# Patient Record
Sex: FEMALE | Race: WHITE | NOT HISPANIC OR LATINO | URBAN - METROPOLITAN AREA
[De-identification: names, ages, dates, MRNs, and addresses within clinical notes are randomized per-mention and may not be internally consistent; named-entity substitution may affect disease eponyms.]

---

## 2017-10-13 ENCOUNTER — INPATIENT (INPATIENT)
Facility: HOSPITAL | Age: 82
LOS: 4 days | Discharge: HOME | End: 2017-10-18
Attending: HOSPITALIST

## 2017-10-13 DIAGNOSIS — M10.9 GOUT, UNSPECIFIED: ICD-10-CM
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
## 2017-10-13 DIAGNOSIS — S72.009A FRACTURE OF UNSPECIFIED PART OF NECK OF UNSPECIFIED FEMUR, INITIAL ENCOUNTER FOR CLOSED FRACTURE: ICD-10-CM

## 2017-10-13 DIAGNOSIS — I10 ESSENTIAL (PRIMARY) HYPERTENSION: ICD-10-CM

## 2017-10-16 PROBLEM — Z00.00 ENCOUNTER FOR PREVENTIVE HEALTH EXAMINATION: Status: ACTIVE | Noted: 2017-10-16

## 2017-10-20 DIAGNOSIS — L03.116 CELLULITIS OF LEFT LOWER LIMB: ICD-10-CM

## 2017-10-20 DIAGNOSIS — F03.90 UNSPECIFIED DEMENTIA, UNSPECIFIED SEVERITY, WITHOUT BEHAVIORAL DISTURBANCE, PSYCHOTIC DISTURBANCE, MOOD DISTURBANCE, AND ANXIETY: ICD-10-CM

## 2017-10-20 DIAGNOSIS — I69.351 HEMIPLEGIA AND HEMIPARESIS FOLLOWING CEREBRAL INFARCTION AFFECTING RIGHT DOMINANT SIDE: ICD-10-CM

## 2017-10-20 DIAGNOSIS — E78.5 HYPERLIPIDEMIA, UNSPECIFIED: ICD-10-CM

## 2017-10-20 DIAGNOSIS — I10 ESSENTIAL (PRIMARY) HYPERTENSION: ICD-10-CM

## 2017-10-20 DIAGNOSIS — M10.9 GOUT, UNSPECIFIED: ICD-10-CM

## 2017-10-20 DIAGNOSIS — R26.2 DIFFICULTY IN WALKING, NOT ELSEWHERE CLASSIFIED: ICD-10-CM

## 2018-01-02 ENCOUNTER — INPATIENT (INPATIENT)
Facility: HOSPITAL | Age: 83
LOS: 0 days | Discharge: HOME | End: 2018-01-03
Attending: EMERGENCY MEDICINE

## 2018-01-02 DIAGNOSIS — I10 ESSENTIAL (PRIMARY) HYPERTENSION: ICD-10-CM
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
## 2018-01-02 DIAGNOSIS — M10.9 GOUT, UNSPECIFIED: ICD-10-CM

## 2018-01-02 DIAGNOSIS — Z86.73 PERSONAL HISTORY OF TRANSIENT ISCHEMIC ATTACK (TIA), AND CEREBRAL INFARCTION WITHOUT RESIDUAL DEFICITS: ICD-10-CM
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
## 2018-01-02 DIAGNOSIS — R53.1 WEAKNESS: ICD-10-CM

## 2018-01-02 DIAGNOSIS — S72.009A FRACTURE OF UNSPECIFIED PART OF NECK OF UNSPECIFIED FEMUR, INITIAL ENCOUNTER FOR CLOSED FRACTURE: ICD-10-CM
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
## 2019-02-19 ENCOUNTER — INPATIENT (INPATIENT)
Facility: HOSPITAL | Age: 84
LOS: 2 days | Discharge: NURSING, REHAB OR LONG-TERM CARE | End: 2019-02-22
Attending: SURGERY | Admitting: SURGERY
Payer: MEDICARE

## 2019-02-19 VITALS
HEART RATE: 80 BPM | OXYGEN SATURATION: 98 % | SYSTOLIC BLOOD PRESSURE: 167 MMHG | DIASTOLIC BLOOD PRESSURE: 77 MMHG | TEMPERATURE: 97 F | RESPIRATION RATE: 20 BRPM

## 2019-02-19 LAB
ALBUMIN SERPL ELPH-MCNC: 4 G/DL — SIGNIFICANT CHANGE UP (ref 3.5–5.2)
ALP SERPL-CCNC: 113 U/L — SIGNIFICANT CHANGE UP (ref 30–115)
ALT FLD-CCNC: 9 U/L — SIGNIFICANT CHANGE UP (ref 0–41)
ANION GAP SERPL CALC-SCNC: 19 MMOL/L — HIGH (ref 7–14)
APTT BLD: 28.7 SEC — SIGNIFICANT CHANGE UP (ref 27–39.2)
AST SERPL-CCNC: 18 U/L — SIGNIFICANT CHANGE UP (ref 0–41)
BASE EXCESS BLDV CALC-SCNC: 0.5 MMOL/L — SIGNIFICANT CHANGE UP (ref -2–2)
BASOPHILS # BLD AUTO: 0.05 K/UL — SIGNIFICANT CHANGE UP (ref 0–0.2)
BASOPHILS NFR BLD AUTO: 0.4 % — SIGNIFICANT CHANGE UP (ref 0–1)
BILIRUB DIRECT SERPL-MCNC: 0.2 MG/DL — SIGNIFICANT CHANGE UP (ref 0–0.2)
BILIRUB INDIRECT FLD-MCNC: 0.4 MG/DL — SIGNIFICANT CHANGE UP (ref 0.2–1.2)
BILIRUB SERPL-MCNC: 0.6 MG/DL — SIGNIFICANT CHANGE UP (ref 0.2–1.2)
BLD GP AB SCN SERPL QL: SIGNIFICANT CHANGE UP
BUN SERPL-MCNC: 22 MG/DL — HIGH (ref 10–20)
CA-I SERPL-SCNC: 1.23 MMOL/L — SIGNIFICANT CHANGE UP (ref 1.12–1.3)
CALCIUM SERPL-MCNC: 9.1 MG/DL — SIGNIFICANT CHANGE UP (ref 8.5–10.1)
CHLORIDE SERPL-SCNC: 99 MMOL/L — SIGNIFICANT CHANGE UP (ref 98–110)
CO2 SERPL-SCNC: 22 MMOL/L — SIGNIFICANT CHANGE UP (ref 17–32)
CREAT SERPL-MCNC: 1.2 MG/DL — SIGNIFICANT CHANGE UP (ref 0.7–1.5)
EOSINOPHIL # BLD AUTO: 0.11 K/UL — SIGNIFICANT CHANGE UP (ref 0–0.7)
EOSINOPHIL NFR BLD AUTO: 1 % — SIGNIFICANT CHANGE UP (ref 0–8)
ETHANOL SERPL-MCNC: <10 MG/DL — HIGH
GAS PNL BLDV: 140 MMOL/L — SIGNIFICANT CHANGE UP (ref 136–145)
GAS PNL BLDV: SIGNIFICANT CHANGE UP
GLUCOSE SERPL-MCNC: 130 MG/DL — HIGH (ref 70–99)
HCO3 BLDV-SCNC: 26 MMOL/L — SIGNIFICANT CHANGE UP (ref 22–29)
HCT VFR BLD CALC: 39.5 % — SIGNIFICANT CHANGE UP (ref 37–47)
HCT VFR BLDA CALC: 45.8 % — HIGH (ref 34–44)
HGB BLD CALC-MCNC: 14.9 G/DL — SIGNIFICANT CHANGE UP (ref 14–18)
HGB BLD-MCNC: 13.1 G/DL — SIGNIFICANT CHANGE UP (ref 12–16)
IMM GRANULOCYTES NFR BLD AUTO: 0.3 % — SIGNIFICANT CHANGE UP (ref 0.1–0.3)
INR BLD: 1.17 RATIO — SIGNIFICANT CHANGE UP (ref 0.65–1.3)
LACTATE BLDV-MCNC: 1.3 MMOL/L — SIGNIFICANT CHANGE UP (ref 0.5–1.6)
LACTATE SERPL-SCNC: 1.3 MMOL/L — SIGNIFICANT CHANGE UP (ref 0.5–2.2)
LIDOCAIN IGE QN: 46 U/L — SIGNIFICANT CHANGE UP (ref 7–60)
LYMPHOCYTES # BLD AUTO: 17.8 % — LOW (ref 20.5–51.1)
LYMPHOCYTES # BLD AUTO: 2.04 K/UL — SIGNIFICANT CHANGE UP (ref 1.2–3.4)
MCHC RBC-ENTMCNC: 30.4 PG — SIGNIFICANT CHANGE UP (ref 27–31)
MCHC RBC-ENTMCNC: 33.2 G/DL — SIGNIFICANT CHANGE UP (ref 32–37)
MCV RBC AUTO: 91.6 FL — SIGNIFICANT CHANGE UP (ref 81–99)
MONOCYTES # BLD AUTO: 1.14 K/UL — HIGH (ref 0.1–0.6)
MONOCYTES NFR BLD AUTO: 9.9 % — HIGH (ref 1.7–9.3)
NEUTROPHILS # BLD AUTO: 8.1 K/UL — HIGH (ref 1.4–6.5)
NEUTROPHILS NFR BLD AUTO: 70.6 % — SIGNIFICANT CHANGE UP (ref 42.2–75.2)
NRBC # BLD: 0 /100 WBCS — SIGNIFICANT CHANGE UP (ref 0–0)
PCO2 BLDV: 45 MMHG — SIGNIFICANT CHANGE UP (ref 41–51)
PH BLDV: 7.37 — SIGNIFICANT CHANGE UP (ref 7.26–7.43)
PLATELET # BLD AUTO: 283 K/UL — SIGNIFICANT CHANGE UP (ref 130–400)
PO2 BLDV: 21 MMHG — SIGNIFICANT CHANGE UP (ref 20–40)
POTASSIUM BLDV-SCNC: 4.1 MMOL/L — SIGNIFICANT CHANGE UP (ref 3.3–5.6)
POTASSIUM SERPL-MCNC: 4.6 MMOL/L — SIGNIFICANT CHANGE UP (ref 3.5–5)
POTASSIUM SERPL-SCNC: 4.6 MMOL/L — SIGNIFICANT CHANGE UP (ref 3.5–5)
PROT SERPL-MCNC: 7.3 G/DL — SIGNIFICANT CHANGE UP (ref 6–8)
PROTHROM AB SERPL-ACNC: 13.4 SEC — HIGH (ref 9.95–12.87)
RBC # BLD: 4.31 M/UL — SIGNIFICANT CHANGE UP (ref 4.2–5.4)
RBC # FLD: 13.2 % — SIGNIFICANT CHANGE UP (ref 11.5–14.5)
SAO2 % BLDV: 27 % — SIGNIFICANT CHANGE UP
SODIUM SERPL-SCNC: 140 MMOL/L — SIGNIFICANT CHANGE UP (ref 135–146)
TROPONIN T SERPL-MCNC: <0.01 NG/ML — SIGNIFICANT CHANGE UP
TYPE + AB SCN PNL BLD: SIGNIFICANT CHANGE UP
WBC # BLD: 11.48 K/UL — HIGH (ref 4.8–10.8)
WBC # FLD AUTO: 11.48 K/UL — HIGH (ref 4.8–10.8)

## 2019-02-19 PROCEDURE — 99291 CRITICAL CARE FIRST HOUR: CPT

## 2019-02-19 RX ORDER — LOSARTAN POTASSIUM 100 MG/1
1 TABLET, FILM COATED ORAL
Qty: 0 | Refills: 0 | COMMUNITY

## 2019-02-19 RX ORDER — ACETAMINOPHEN 500 MG
1000 TABLET ORAL ONCE
Qty: 0 | Refills: 0 | Status: COMPLETED | OUTPATIENT
Start: 2019-02-19 | End: 2019-02-19

## 2019-02-19 RX ORDER — LIDOCAINE 4 G/100G
1 CREAM TOPICAL ONCE
Qty: 0 | Refills: 0 | Status: COMPLETED | OUTPATIENT
Start: 2019-02-19 | End: 2019-02-19

## 2019-02-19 RX ORDER — APIXABAN 2.5 MG/1
1 TABLET, FILM COATED ORAL
Qty: 0 | Refills: 0 | COMMUNITY

## 2019-02-19 RX ORDER — CEFTRIAXONE 500 MG/1
1 INJECTION, POWDER, FOR SOLUTION INTRAMUSCULAR; INTRAVENOUS ONCE
Qty: 0 | Refills: 0 | Status: COMPLETED | OUTPATIENT
Start: 2019-02-19 | End: 2019-02-19

## 2019-02-19 RX ORDER — IBUPROFEN 200 MG
600 TABLET ORAL EVERY 6 HOURS
Qty: 0 | Refills: 0 | Status: DISCONTINUED | OUTPATIENT
Start: 2019-02-19 | End: 2019-02-21

## 2019-02-19 RX ORDER — ACETAMINOPHEN 500 MG
650 TABLET ORAL EVERY 6 HOURS
Qty: 0 | Refills: 0 | Status: DISCONTINUED | OUTPATIENT
Start: 2019-02-19 | End: 2019-02-19

## 2019-02-19 RX ORDER — ASPIRIN/CALCIUM CARB/MAGNESIUM 324 MG
1 TABLET ORAL
Qty: 0 | Refills: 0 | COMMUNITY

## 2019-02-19 RX ORDER — METOCLOPRAMIDE HCL 10 MG
10 TABLET ORAL DAILY
Qty: 0 | Refills: 0 | Status: DISCONTINUED | OUTPATIENT
Start: 2019-02-19 | End: 2019-02-22

## 2019-02-19 RX ORDER — LOSARTAN POTASSIUM 100 MG/1
100 TABLET, FILM COATED ORAL DAILY
Qty: 0 | Refills: 0 | Status: DISCONTINUED | OUTPATIENT
Start: 2019-02-19 | End: 2019-02-22

## 2019-02-19 RX ORDER — CHLORHEXIDINE GLUCONATE 213 G/1000ML
1 SOLUTION TOPICAL
Qty: 0 | Refills: 0 | Status: DISCONTINUED | OUTPATIENT
Start: 2019-02-19 | End: 2019-02-22

## 2019-02-19 RX ORDER — OXYCODONE HYDROCHLORIDE 5 MG/1
5 TABLET ORAL EVERY 6 HOURS
Qty: 0 | Refills: 0 | Status: DISCONTINUED | OUTPATIENT
Start: 2019-02-19 | End: 2019-02-22

## 2019-02-19 RX ORDER — ASPIRIN/CALCIUM CARB/MAGNESIUM 324 MG
81 TABLET ORAL DAILY
Qty: 0 | Refills: 0 | Status: DISCONTINUED | OUTPATIENT
Start: 2019-02-19 | End: 2019-02-22

## 2019-02-19 RX ORDER — AZITHROMYCIN 500 MG/1
500 TABLET, FILM COATED ORAL ONCE
Qty: 0 | Refills: 0 | Status: COMPLETED | OUTPATIENT
Start: 2019-02-19 | End: 2019-02-19

## 2019-02-19 RX ORDER — METOCLOPRAMIDE HCL 10 MG
1 TABLET ORAL
Qty: 0 | Refills: 0 | COMMUNITY

## 2019-02-19 RX ORDER — APIXABAN 2.5 MG/1
2.5 TABLET, FILM COATED ORAL EVERY 12 HOURS
Qty: 0 | Refills: 0 | Status: DISCONTINUED | OUTPATIENT
Start: 2019-02-19 | End: 2019-02-20

## 2019-02-19 RX ORDER — ACETAMINOPHEN 500 MG
650 TABLET ORAL EVERY 6 HOURS
Qty: 0 | Refills: 0 | Status: DISCONTINUED | OUTPATIENT
Start: 2019-02-19 | End: 2019-02-22

## 2019-02-19 RX ORDER — IBUPROFEN 200 MG
400 TABLET ORAL EVERY 6 HOURS
Qty: 0 | Refills: 0 | Status: DISCONTINUED | OUTPATIENT
Start: 2019-02-19 | End: 2019-02-19

## 2019-02-19 RX ORDER — PANTOPRAZOLE SODIUM 20 MG/1
40 TABLET, DELAYED RELEASE ORAL
Qty: 0 | Refills: 0 | Status: DISCONTINUED | OUTPATIENT
Start: 2019-02-19 | End: 2019-02-22

## 2019-02-19 RX ADMIN — AZITHROMYCIN 255 MILLIGRAM(S): 500 TABLET, FILM COATED ORAL at 17:41

## 2019-02-19 RX ADMIN — Medication 81 MILLIGRAM(S): at 21:07

## 2019-02-19 RX ADMIN — PANTOPRAZOLE SODIUM 40 MILLIGRAM(S): 20 TABLET, DELAYED RELEASE ORAL at 21:07

## 2019-02-19 RX ADMIN — CEFTRIAXONE 100 GRAM(S): 500 INJECTION, POWDER, FOR SOLUTION INTRAMUSCULAR; INTRAVENOUS at 17:41

## 2019-02-19 RX ADMIN — APIXABAN 2.5 MILLIGRAM(S): 2.5 TABLET, FILM COATED ORAL at 21:07

## 2019-02-19 RX ADMIN — LOSARTAN POTASSIUM 100 MILLIGRAM(S): 100 TABLET, FILM COATED ORAL at 21:07

## 2019-02-19 RX ADMIN — Medication 1000 MILLIGRAM(S): at 17:41

## 2019-02-19 RX ADMIN — LIDOCAINE 1 PATCH: 4 CREAM TOPICAL at 17:36

## 2019-02-19 RX ADMIN — Medication 10 MILLIGRAM(S): at 21:08

## 2019-02-19 NOTE — H&P ADULT - ASSESSMENT
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     PLAN:   -Pain control  - IS spirometry   - ambulate as tolerated  - ICU care as only pulling 750 on IS.  - Cont Xarelto for previously dx DVT. ASSESSMENT:  89y Female w/ PMHx of Stroke on ASA and DVT on Xarelto s/p fall, -HT, ?LOC, +AC, +ASA came with 2 cm abrasion on Right cheek and found to have Age-indeterminate nondisplaced fractures of the left 4th - 6th anterolateral ribs and age-indeterminate compression deformity of the L3   vertebral body.     PLAN:   -Pain control  - IS spirometry   - ambulate as tolerated  - ICU care as only pulling 750 on IS.  - Cont eliquis for previously dx DVT.     Senior Note Agree with Above.

## 2019-02-19 NOTE — ED ADULT TRIAGE NOTE - CHIEF COMPLAINT QUOTE
Patient diagnosed with a right leg DVT x 10 days ago. Patient on Eliquis. Patient sent in by NP for possible detachment. No complaints by the patient. No leg swelling.

## 2019-02-19 NOTE — ED PROVIDER NOTE - NS ED ROS FT
Constitutional: No fevers/chills.    Head: No injury, headache.    Eyes:  No eye pain or discharge. No injury.    ENMT:  No pain, discharge or infections. No neck pain or stiffness. No loss ROM.    Cardiac:  No chest pain, (+) SOB (-) edema.    Respiratory:  No cough or respiratory distress.     GI:  No nausea, vomiting, diarrhea or abdominal pain. No change in PO intake.    :  No frequency, urgency or burning. No change in urine output.    MS:  No leg pain, leg swelling, myalgia, muscle weakness, joint pain or back pain. No loss ROM.    Neuro:  No dizziness or weakness.  No LOC.    Skin:  No skin rash.

## 2019-02-19 NOTE — ED PROVIDER NOTE - CLINICAL SUMMARY MEDICAL DECISION MAKING FREE TEXT BOX
Pt here for right sided weakness and confusion sp fall on Saturday. Now unable to ambulate. Found ot have ?pna and rib fractures. Admitted to the trauma service. Given abx.

## 2019-02-19 NOTE — ED PROVIDER NOTE - CARE PLAN
Principal Discharge DX:	Multiple rib fractures  Secondary Diagnosis:	Frequent falls  Secondary Diagnosis:	Pneumonia

## 2019-02-19 NOTE — CONSULT NOTE ADULT - ASSESSMENT
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

## 2019-02-19 NOTE — ED ADULT NURSE NOTE - CHPI ED NUR SYMPTOMS POS
PAIN/s/p fall on Saturday- c/o pain all over body, unknown LOC, pt not on blood thinners. Daughter reports confusion since fall. Pt is alert and oriented x 3 at this time.

## 2019-02-19 NOTE — ED PROVIDER NOTE - ATTENDING CONTRIBUTION TO CARE
Pt seen in critical care area. hx from daughter, HHA and patient. hx of cva with residual right weakness and pain, ambulates with walker at baseline, presenting with worsening right sided weakness and pain sp fall on Saturday. +head trauma, no loc. On Eliquis and ASA. Non ambulatory currently per family at bedside. Also appears more confused per HHA. Face is baseline appearance/symmetry. Pt endorses mild shortness of breath at rest, no chest pain.   trauma alert activated.   VITAL SIGNS: I have reviewed nursing notes and confirm.  CONSTITUTIONAL: non-toxic, well appearing, + airway intact, GCS 15  SKIN: no rash, no petechiae. no ecchymosis, abrasion to right cheek, no lacerations  EYES: EOMI, pupils symmetric.  ENT: tongue midline, oral mucosa atraumatic, nares patent,  NECK: Supple; no cervical-thoracic-lumbar spine tenderness  CARD: RRR, equal radial pulses bilaterally 2+  RESP: CTAB, no respiratory distress, no crepitus over chest wall, left sternal tenderness without appreciable deformity or ecchymosis.  ABD: Soft, non-tender, non-distended  PELVIS: stable  EXT: Normal ROM x4. RUE generalized tenderness throughout extremity. no snuff box tenderness. equal strength bilaterally. LLE calf tenderness.  NEURO: Alert, oriented. CN2-12 intact, equal strength bilaterally, Sensation to soft touch grossly intact and symmetric in extremities.   PSYCH: Cooperative, appropriate.   Fall on A/c - trauma scan, xray, labs, ekg, analgesia, reassess.

## 2019-02-19 NOTE — ED PROVIDER NOTE - OBJECTIVE STATEMENT
89 yo female presents with fall and head trauma on Eliquis and ASA. Patient states she has some mild SOB. Patient/family denies fevers, chest pain, abdominal pain, nausea, vomiting, diarrhea, constipation, dizziness, weakness, recent travel, leg pain/swelling, urinary sx, cough, congestion, changes in PO intake, changes in urine output.

## 2019-02-19 NOTE — ED ADULT NURSE NOTE - NSIMPLEMENTINTERV_GEN_ALL_ED
Implemented All Fall with Harm Risk Interventions:  [place] to call system. Call bell, personal items and telephone within reach. Instruct patient to call for assistance. Room bathroom lighting operational. Non-slip footwear when patient is off stretcher. Physically safe environment: no spills, clutter or unnecessary equipment. Stretcher in lowest position, wheels locked, appropriate side rails in place. Provide visual cue, wrist band, yellow gown, etc. Monitor gait and stability. Monitor for mental status changes and reorient to person, place, and time. Review medications for side effects contributing to fall risk. Reinforce activity limits and safety measures with patient and family. Provide visual clues: red socks.

## 2019-02-19 NOTE — ED PROVIDER NOTE - CRITICAL CARE PROVIDED
consult w/ pt's family directly relating to pts condition/documentation/additional history taking/consultation with other physicians/interpretation of diagnostic studies/direct patient care (not related to procedure)

## 2019-02-19 NOTE — CONSULT NOTE ADULT - SUBJECTIVE AND OBJECTIVE BOX
Trauma Surgery Consultation Note    TRAUMA ACTIVATION LEVEL:  Trauma Alert    MECHANISM OF INJURY:      [x] Blunt  	[] MVC	[x] Fall	[] Pedestrian Struck	[] Motorcycle   [] Assault   [] Bicycle collision  [] Sports injury     [] Penetrating  	[] Gun Shot Wound 		[] Stab Wound    GCS: 15/15 	E: 4/4	V: 5/5	M: 6/6    89 year old F with PMH old stroke of unknown age (probably 1 years ago) with residual weakness and pain on Right side and on ASA, also diagnoses with DVT 10 days ago on Xarelto presented to ED s/p fall on Saturday, -HT, -LOC, +AC and +ASA. She fell on R side and striked her R cheek on floor. Since the fall, both pain and weakness has been increased and pt is unable to perform her activity of daily life as she was performing at baseline like using walker. She is complaining of pain all over body but more on R side. Pt does not remember well how she fell. Daughter reports confusion since fall. Pt is alert and oriented x 3 at this time.      PAST MEDICAL & SURGICAL HISTORY:  Stroke  DVT    Home Meds:  ASA  Eliquis  Losartan    Allergies:   No Known Allergies    Intolerances  None reported    Soc:   Advanced Directives: Presumed Full Code     ROS:    REVIEW OF SYSTEMS    [x] A ten-point review of systems was otherwise negative except as noted.  [ ] Due to altered mental status/intubation, subjective information were not able to be obtained from the patient. History was obtained, to the extent possible, from review of the chart and collateral sources of information.      --------------------------------------------------------------------------------------  VITAL SIGNS, INS/OUTS (last 24 hours):  --------------------------------------------------------------------------------------  ICU Vital Signs Last 24 Hrs  T(C): 36.7 (19 Feb 2019 14:05), Max: 36.7 (19 Feb 2019 13:55)  T(F): 98 (19 Feb 2019 14:05), Max: 98.1 (19 Feb 2019 13:55)  HR: 85 (19 Feb 2019 14:05) (80 - 85)  BP: 185/90 (19 Feb 2019 14:05) (167/77 - 188/78)  RR: 18 (19 Feb 2019 14:05) (18 - 20)  SpO2: 99% (19 Feb 2019 14:05) (98% - 99%)    PHYSICAL EXAM    General: NAD, AAOx3, calm and cooperative  HEENT: NCAT, Neck supple, 2 cm abrasion is noted on R cheek  Cardiac: RRR S1, S2  Respiratory: CTAB, normal respiratory effort, breath sounds equal BL  Abdomen: Soft, non-distended, non-tender, +bowel sounds  Musculoskeletal: LLE swelling w/ gastrocnemius tenderness  Neuro: Grossly intact  Skin: Warm/dry, normal color, no jaundice    LABS  Pending.    IMAGING  Pending. Trauma Surgery Consultation Note    TRAUMA ACTIVATION LEVEL:  Trauma Alert    MECHANISM OF INJURY:      [x] Blunt  	[] MVC	[x] Fall	[] Pedestrian Struck	[] Motorcycle   [] Assault   [] Bicycle collision  [] Sports injury     [] Penetrating  	[] Gun Shot Wound 		[] Stab Wound    GCS: 15/15 	E: 4/4	V: 5/5	M: 6/6    89 year old F with PMH old stroke of unknown age (probably 1 years ago) with residual weakness and pain on Right side and on ASA, also diagnoses with DVT 10 days ago on Xarelto presented to ED s/p fall on Saturday, -HT, -LOC, +AC and +ASA. She fell on R side and striked her R cheek on floor. Since the fall, both pain and weakness has been increased and pt is unable to perform her activity of daily life as she was performing at baseline like using walker. She is complaining of pain all over body but more on R side. Pt does not remember well how she fell. Daughter reports confusion since fall. Pt is alert and oriented x 3 at this time.      PAST MEDICAL & SURGICAL HISTORY:  Stroke  DVT    Home Meds:  ASA  Eliquis  Losartan    Allergies:   No Known Allergies    Intolerances  None reported    Soc:   Advanced Directives: Presumed Full Code     ROS:    REVIEW OF SYSTEMS    [x] A ten-point review of systems was otherwise negative except as noted.  [ ] Due to altered mental status/intubation, subjective information were not able to be obtained from the patient. History was obtained, to the extent possible, from review of the chart and collateral sources of information.      --------------------------------------------------------------------------------------  VITAL SIGNS, INS/OUTS (last 24 hours):  --------------------------------------------------------------------------------------  ICU Vital Signs Last 24 Hrs  T(C): 36.7 (19 Feb 2019 14:05), Max: 36.7 (19 Feb 2019 13:55)  T(F): 98 (19 Feb 2019 14:05), Max: 98.1 (19 Feb 2019 13:55)  HR: 85 (19 Feb 2019 14:05) (80 - 85)  BP: 185/90 (19 Feb 2019 14:05) (167/77 - 188/78)  RR: 18 (19 Feb 2019 14:05) (18 - 20)  SpO2: 99% (19 Feb 2019 14:05) (98% - 99%)    PHYSICAL EXAM    General: NAD, AAOx3, calm and cooperative  HEENT: NCAT, Neck supple, 2 cm abrasion is noted on R cheek  Cardiac: RRR S1, S2  Respiratory: CTAB, normal respiratory effort, breath sounds equal BL  Abdomen: Soft, non-distended, non-tender, +bowel sounds  Musculoskeletal: LLE swelling w/ gastrocnemius tenderness  Neuro: Grossly intact  Skin: Warm/dry, normal color, no jaundice    LABS  Pending.    IMAGING  Pending.    < from: Xray Chest 1 View AP/PA (02.19.19 @ 14:59) >  Impression:      Developing right-sidedairspace opacity.    < end of copied text >    < from: CT Angio Abdomen and Pelvis w/ IV Cont (02.19.19 @ 14:49) >    IMPRESSION:     1. No definite evidence of acute traumatic injury within the chest,   abdomen or pelvis.     2. New or increased focal patchy opacity within the right upper lobe,   possibly reflecting pneumonia in the appropriate clinical setting;   follow-up to resolution is suggested.     3. Age-indeterminate nondisplaced fractures of the left 4th - 6th   anterolateral ribs and age-indeterminate compression deformity of the L3   vertebral body.       < end of copied text >    < from: CT Angio Chest w/ IV Cont (02.19.19 @ 14:49) >  IMPRESSION:     1. No definite evidence of acute traumatic injury within the chest,   abdomen or pelvis.     2. New or increased focal patchy opacity within the right upper lobe,   possibly reflecting pneumonia in the appropriate clinical setting;   follow-up to resolution is suggested.     3. Age-indeterminate nondisplaced fractures of the left 4th - 6th   anterolateral ribs and age-indeterminate compression deformity of the L3   vertebral body.     < end of copied text >    < from: CT Head No Cont (02.19.19 @ 14:49) >  IMPRESSION:    In comparison with the prior noncontrast CT scan of the brain dated   January 2, 2018:    No acute intracranial hemorrhage.    Stable examination.    < end of copied text >    < from: CT Cervical Spine No Cont (02.19.19 @ 14:49) >  IMPRESSION:    In comparison with the prior noncontrast CT scan of the cervical spine   dated October 5, 2014:    No acute fracture demonstrated.    Stable examination.    < end of copied text >

## 2019-02-19 NOTE — CONSULT NOTE ADULT - ASSESSMENT
ASSESSMENT:  89y F s/p mechanical fall, with right sided weakness. MHx of Stroke on ASA and DVCT on Xarelto s/p fall, -HT, ?LOC, +AC, +ASA came with 2 cm abrasion on Right cheek    PLAN:   Neurologic: pain control with tylenol and motrin   Respiratory: decreased IS, pulling 750ml, with multiple rib fx   Cardiovascular: ASA and Losartan for pmhx of HTN  Gastrointestinal/Nutrition: diet DASH  Renal/Genitourinary: no shirley, strict I/Os   Hematologic:   Infectious Disease: possible infiltrate to RUL, c/w azithromycin and ceftriaxone   Lines/Tubes: PIV  Endocrine: no dx   Disposition: ICU for monitoring ASSESSMENT:  89y F s/p mechanical fall, with right sided weakness. MHx of Stroke on ASA and DVCT on Xarelto s/p fall, -HT, ?LOC, +AC, +ASA came with 2 cm abrasion on Right cheek    PLAN:   Neurologic: pain control with tylenol and motrin   Respiratory: decreased IS, pulling 750ml, with multiple rib fx   Cardiovascular: ASA and Losartan for pmhx of HTN  Gastrointestinal/Nutrition: diet DASH  Renal/Genitourinary: no shirley, strict I/Os   Hematologic: c/w eliquis for dvt   Infectious Disease: possible infiltrate to RUL, c/w azithromycin and ceftriaxone   Lines/Tubes: PIV  Endocrine: no dx   Disposition: ICU for monitoring

## 2019-02-19 NOTE — H&P ADULT - NSHPLABSRESULTS_GEN_ALL_CORE
Labs:  CAPILLARY BLOOD GLUCOSE  POCT Blood Glucose.: 105 mg/dL (19 Feb 2019 13:59)                          13.1   11.48 )-----------( 283      ( 19 Feb 2019 14:10 )             39.5     Auto Neutrophil %: 70.6 % (02-19-19 @ 14:10)  Auto Immature Granulocyte %: 0.3 % (02-19-19 @ 14:10)    02-19    140  |  99  |  22<H>  ----------------------------<  130<H>  4.6   |  22  |  1.2    Calcium, Total Serum: 9.1 mg/dL (02-19-19 @ 14:10)               7.3  | 0.6  | 18       ------------------[113     ( 19 Feb 2019 14:10 )  4.0  | 0.2  | 9           Lipase:46     Lactate, Blood: 1.3 mmol/L (02-19-19 @ 14:10)  Blood Gas Venous - Lactate: 1.3 mmoL/L (02-19-19 @ 13:46)  Coags:     13.40  ----< 1.17    ( 19 Feb 2019 14:10 )     28.7    CARDIAC MARKERS ( 19 Feb 2019 14:10 )  x     / <0.01 ng/mL / x     / x     / x          < from: CT Angio Abdomen and Pelvis w/ IV Cont (02.19.19 @ 14:49) >      IMPRESSION:     1. No definite evidence of acute traumatic injury within the chest,   abdomen or pelvis.     2. New or increased focal patchy opacity within the right upper lobe,   possibly reflecting pneumonia in the appropriate clinical setting;   follow-up to resolution is suggested.     3. Age-indeterminate nondisplaced fractures of the left 4th - 6th   anterolateral ribs and age-indeterminate compression deformity of the L3   vertebral body.       < from: CT Angio Chest w/ IV Cont (02.19.19 @ 14:49) >    IMPRESSION:     1. No definite evidence of acute traumatic injury within the chest,   abdomen or pelvis.     2. New or increased focal patchy opacity within the right upper lobe,   possibly reflecting pneumonia in the appropriate clinical setting;   follow-up to resolution is suggested.     3. Age-indeterminate nondisplaced fractures of the left 4th - 6th   anterolateral ribs and age-indeterminate compression deformity of the L3   vertebral body.   < from: CT Head No Cont (02.19.19 @ 14:49) >    IMPRESSION:    In comparison with the prior noncontrast CT scan of the brain dated   January 2, 2018:    No acute intracranial hemorrhage.    Stable examination.    < from: CT Cervical Spine No Cont (02.19.19 @ 14:49) >    IMPRESSION:In comparison with the prior noncontrast CT scan of the cervical spine   dated October 5, 2014:    No acute fracture demonstrated.    Stable examination.

## 2019-02-19 NOTE — ED PROVIDER NOTE - PHYSICAL EXAMINATION
GEN: Well appearing, in no apparent distress.    HEAD:  Normocephalic, abrasion to right cheek.    EYES:  Clear conjunctivae without injection, drainage or discharge.    ENMT:  Nasal mucosa moist; mouth moist without ulcerations or lesions; throat moist without erythema, exudate, ulcerations or lesions.    NECK:  Supple, no masses. Normal ROM.    CARDIAC:  RRR, normal S1 and S2, no murmurs, rubs or gallops.    RESP:  Respiratory rate and effort appear normal; lungs are clear to auscultation bilaterally; no rhonchi, rales or wheezes.    ABDOMEN:  Soft, non-tender, non-distended, no masses. Normal BS throughout.    MUSCULOSKELETAL: No leg swelling.  NEURO:  AAO x 3. Motor 5/5. Sensory intact. CN II – XII intact.     SKIN:  Normal skin color for age and race, well-perfused; warm and dry.

## 2019-02-19 NOTE — CONSULT NOTE ADULT - SUBJECTIVE AND OBJECTIVE BOX
SICU Consultation Note  =====================================================  HPI:     89 year old F with PMH old stroke of unknown age (probably 1 years ago) with residual weakness and pain on Right side and on ASA, also diagnoses with DVT 10 days ago on Xarelto presented to ED s/p fall on Saturday, -HT, -LOC, +AC and +ASA. She fell on R side and striked her R cheek on floor. Since the fall, both pain and weakness has been increased and pt is unable to perform her activity of daily life as she was performing at baseline like using walker. She is complaining of pain all over body but more on R side. Pt does not remember well how she fell. Daughter reports confusion since fall. Pt is alert and oriented x 3 at this time.        PAST MEDICAL & SURGICAL HISTORY:    Home Meds: Home Medications:  Aspir 81 oral delayed release tablet: 1 tab(s) orally once a day (19 Feb 2019 19:14)  Eliquis 2.5 mg oral tablet: 1 tab(s) orally 2 times a day (19 Feb 2019 19:14)  losartan 100 mg oral tablet: 1 tab(s) orally once a day (19 Feb 2019 19:14)  Reglan 10 mg oral tablet: 1 tab(s) orally once a day, As Needed (19 Feb 2019 19:14)    Allergies: Allergies    No Known Allergies    Intolerances      Soc:   Advanced Directives: Presumed Full Code     ROS:    REVIEW OF SYSTEMS    [x ] A ten-point review of systems was otherwise negative except as noted.  [ ] Due to altered mental status/intubation, subjective information were not able to be obtained from the patient. History was obtained, to the extent possible, from review of the chart and collateral sources of information.      CURRENT MEDICATIONS:   --------------------------------------------------------------------------------------  Neurologic Medications    Respiratory Medications    Cardiovascular Medications    Gastrointestinal Medications    Genitourinary Medications    Hematologic/Oncologic Medications    Antimicrobial/Immunologic Medications    Endocrine/Metabolic Medications    Topical/Other Medications  lidocaine   Patch 1 Patch Transdermal Once    --------------------------------------------------------------------------------------    VITAL SIGNS, INS/OUTS (last 24 hours):  --------------------------------------------------------------------------------------  ICU Vital Signs Last 24 Hrs  T(C): 36.7 (19 Feb 2019 14:05), Max: 36.7 (19 Feb 2019 13:55)  T(F): 98 (19 Feb 2019 14:05), Max: 98.1 (19 Feb 2019 13:55)  HR: 85 (19 Feb 2019 14:05) (80 - 85)  BP: 185/90 (19 Feb 2019 14:05) (167/77 - 188/78)  BP(mean): --  ABP: --  ABP(mean): --  RR: 18 (19 Feb 2019 14:05) (18 - 20)  SpO2: 99% (19 Feb 2019 14:05) (98% - 99%)    I&O's Summary    --------------------------------------------------------------------------------------    EXAM:  General/Neuro  RASS:   GCS:   Exam: Normal, NAD, alert, oriented x 3, no focal deficits. PERRLA  ***    Respiratory  Exam: Lungs clear to auscultation, Normal expansion/effort.  ***  [] Tracheostomy   [] Intubated  Mechanical Ventilation:     Cardiovascular  Exam: S1, S2.  Regular rate and rhythm.  Peripheral edema  ***  Cardiac Rhythm: Normal Sinus Rhythm  ECHO:     GI  Exam: Abdomen soft, Non-tender, Non-distended.  Gastrostomy / Jejunostomy tube in place.  Nasogastric tube in place.  Colostomy / Ileostomy.  ***  Wound:   ***  Current Diet:  NPO***      Tubes/Lines/Drains  ***  [x] Peripheral IV  [] Central Venous Line     	[] R	[] L	[] IJ	[] Fem	[] SC        Type:	    Date Placed:   [] Arterial Line		[] R	[] L	[] Fem	[] Rad	[] Ax	Date Placed:   [] PICC:         	[] Midline		[] Mediport           [] Urinary Catheter		Date Placed:     Extremities  Exam: Extremities warm, pink, well-perfused.        Derm:  Exam: Good skin turgor, no skin breakdown.      :   Exam: Thayer catheter in place.     LABS  --------------------------------------------------------------------------------------  Labs:  CAPILLARY BLOOD GLUCOSE      POCT Blood Glucose.: 105 mg/dL (19 Feb 2019 13:59)                          13.1   11.48 )-----------( 283      ( 19 Feb 2019 14:10 )             39.5       Auto Neutrophil %: 70.6 % (02-19-19 @ 14:10)  Auto Immature Granulocyte %: 0.3 % (02-19-19 @ 14:10)    02-19    140  |  99  |  22<H>  ----------------------------<  130<H>  4.6   |  22  |  1.2      Calcium, Total Serum: 9.1 mg/dL (02-19-19 @ 14:10)      LFTs:             7.3  | 0.6  | 18       ------------------[113     ( 19 Feb 2019 14:10 )  4.0  | 0.2  | 9           Lipase:46     Amylase:x         Lactate, Blood: 1.3 mmol/L (02-19-19 @ 14:10)  Blood Gas Venous - Lactate: 1.3 mmoL/L (02-19-19 @ 13:46)      Coags:     13.40  ----< 1.17    ( 19 Feb 2019 14:10 )     28.7        CARDIAC MARKERS ( 19 Feb 2019 14:10 )  x     / <0.01 ng/mL / x     / x     / x            Alcohol, Blood: <10 mg/dL (02-19-19 @ 14:10)          --------------------------------------------------------------------------------------    OTHER LABS    IMAGING RESULTS      ASSESSMENT:  89y Female ***    PLAN:   Neurologic:   Respiratory:   Cardiovascular:   Gastrointestinal/Nutrition:   Renal/Genitourinary:   Hematologic:   Infectious Disease:   Lines/Tubes:  Endocrine:   Disposition:     --------------------------------------------------------------------------------------    Critical Care Diagnoses: SICU Consultation Note  =====================================================  HPI:     89 year old F with PMH old stroke of unknown age (probably 1 years ago) with residual right sided weakness and pain on Right side and on ASA, also diagnoses with DVT 10 days ago on Xarelto presented to ED s/p fall on Saturday, -HT, -LOC, +AC and +ASA. She fell on R side and striked her R cheek on floor. Since the fall, both pain and weakness has been increased and pt is unable to perform her activity of daily life as she was performing at baseline like using walker. She is complaining of pain all over body but more on R side. Pt does not remember well how she fell. Daughter reports confusion since fall. Pt is alert and oriented x 3 at this time.    Monegasque speaking only     Home Meds: Home Medications:  Aspir 81 oral delayed release tablet: 1 tab(s) orally once a day (19 Feb 2019 19:14)  Eliquis 2.5 mg oral tablet: 1 tab(s) orally 2 times a day (19 Feb 2019 19:14)  losartan 100 mg oral tablet: 1 tab(s) orally once a day (19 Feb 2019 19:14)  Reglan 10 mg oral tablet: 1 tab(s) orally once a day, As Needed (19 Feb 2019 19:14)    Allergies: Allergies    No Known Allergies    Intolerances      Soc:   Advanced Directives: Presumed Full Code     ROS:    REVIEW OF SYSTEMS    [x ] A ten-point review of systems was otherwise negative except as noted.  [ ] Due to altered mental status/intubation, subjective information were not able to be obtained from the patient. History was obtained, to the extent possible, from review of the chart and collateral sources of information.      CURRENT MEDICATIONS:   --------------------------------------------------------------------------------------  Neurologic Medications  acetaminophen   Tablet .. 650 milliGRAM(s) Oral every 6 hours PRN Mild Pain (1 - 3)  ibuprofen  Tablet. 400 milliGRAM(s) Oral every 6 hours PRN Mild Pain (1 - 3)    Respiratory Medications    Cardiovascular Medications  Losartan 100mg  ASA 81 mg     Gastrointestinal Medications    Genitourinary Medications    Hematologic/Oncologic Medications    Antimicrobial/Immunologic Medications    Endocrine/Metabolic Medications    Topical/Other Medications  lidocaine   Patch 1 Patch Transdermal Once    --------------------------------------------------------------------------------------    VITAL SIGNS, INS/OUTS (last 24 hours):  --------------------------------------------------------------------------------------  ICU Vital Signs Last 24 Hrs  T(C): 36.7 (19 Feb 2019 14:05), Max: 36.7 (19 Feb 2019 13:55)  T(F): 98 (19 Feb 2019 14:05), Max: 98.1 (19 Feb 2019 13:55)  HR: 85 (19 Feb 2019 14:05) (80 - 85)  BP: 185/90 (19 Feb 2019 14:05) (167/77 - 188/78)  BP(mean): --  ABP: --  ABP(mean): --  RR: 18 (19 Feb 2019 14:05) (18 - 20)  SpO2: 99% (19 Feb 2019 14:05) (98% - 99%)    I&O's Summary    --------------------------------------------------------------------------------------    EXAM:  General/Neuro  GCS: 15  Exam: Normal, NAD, alert, oriented x 2, no focal deficits. PERRLA      Respiratory  Exam: Lungs clear to auscultation      Cardiovascular  Exam: S1, S2.  Regular rate and rhythm.   Cardiac Rhythm: Normal Sinus Rhythm    GI  Exam: Abdomen soft, Non-tender, Non-distended.    Current Diet:  dash      Tubes/Lines/Drains  ***  [x] Peripheral IV      Extremities  Exam: Extremities warm, pink, well-perfused.        Derm:  Exam: Good skin turgor, no skin breakdown.      :   Exam: Shirley catheter in place.     LABS  --------------------------------------------------------------------------------------  Labs:  CAPILLARY BLOOD GLUCOSE      POCT Blood Glucose.: 105 mg/dL (19 Feb 2019 13:59)                          13.1   11.48 )-----------( 283      ( 19 Feb 2019 14:10 )             39.5       Auto Neutrophil %: 70.6 % (02-19-19 @ 14:10)  Auto Immature Granulocyte %: 0.3 % (02-19-19 @ 14:10)    02-19    140  |  99  |  22<H>  ----------------------------<  130<H>  4.6   |  22  |  1.2      Calcium, Total Serum: 9.1 mg/dL (02-19-19 @ 14:10)      LFTs:             7.3  | 0.6  | 18       ------------------[113     ( 19 Feb 2019 14:10 )  4.0  | 0.2  | 9           Lipase:46     Amylase:x         Lactate, Blood: 1.3 mmol/L (02-19-19 @ 14:10)  Blood Gas Venous - Lactate: 1.3 mmoL/L (02-19-19 @ 13:46)      Coags:     13.40  ----< 1.17    ( 19 Feb 2019 14:10 )     28.7        CARDIAC MARKERS ( 19 Feb 2019 14:10 )  x     / <0.01 ng/mL / x     / x     / x            Alcohol, Blood: <10 mg/dL (02-19-19 @ 14:10)          --------------------------------------------------------------------------------------    OTHER LABS    IMAGING RESULTS     < from: Xray Chest 1 View AP/PA (02.19.19 @ 14:59) >  Impression:      Developing right-sidedairspace opacity.    < end of copied text >    < from: CT Angio Abdomen and Pelvis w/ IV Cont (02.19.19 @ 14:49) >    IMPRESSION:     1. No definite evidence of acute traumatic injury within the chest,   abdomen or pelvis.     2. New or increased focal patchy opacity within the right upper lobe,   possibly reflecting pneumonia in the appropriate clinical setting;   follow-up to resolution is suggested.     3. Age-indeterminate nondisplaced fractures of the left 4th - 6th   anterolateral ribs and age-indeterminate compression deformity of the L3   vertebral body.       < end of copied text >    < from: CT Angio Chest w/ IV Cont (02.19.19 @ 14:49) >  IMPRESSION:     1. No definite evidence of acute traumatic injury within the chest,   abdomen or pelvis.     2. New or increased focal patchy opacity within the right upper lobe,   possibly reflecting pneumonia in the appropriate clinical setting;   follow-up to resolution is suggested.     3. Age-indeterminate nondisplaced fractures of the left 4th - 6th   anterolateral ribs and age-indeterminate compression deformity of the L3   vertebral body.     < end of copied text >    < from: CT Head No Cont (02.19.19 @ 14:49) >  IMPRESSION:    In comparison with the prior noncontrast CT scan of the brain dated   January 2, 2018:    No acute intracranial hemorrhage.    Stable examination.    < end of copied text >    < from: CT Cervical Spine No Cont (02.19.19 @ 14:49) >  IMPRESSION:    In comparison with the prior noncontrast CT scan of the cervical spine   dated October 5, 2014:    No acute fracture demonstrated.    Stable examination.    < end of copied text >      ASSESSMENT:  89y F s/p mechanical fall, with right sided weakness. MHx of Stroke on ASA and DVCT on Xarelto s/p fall, -HT, ?LOC, +AC, +ASA came with 2 cm abrasion on Right cheek    PLAN:   Neurologic: pain control with tylenol and motrin   Respiratory: decreased IS, pulling 750ml, with multiple rib fx   Cardiovascular: ASA and Losartan for pmhx of HTN  Gastrointestinal/Nutrition: diet DASH  Renal/Genitourinary: no shirley, strict I/Os   Hematologic:   Infectious Disease: possible infiltrate to RUL, c/w azithromycin and ceftriaxone   Lines/Tubes: PIV  Endocrine: no dx   Disposition: ICU for monitoring SICU Consultation Note  =====================================================  HPI:     89 year old F with PMH old stroke of unknown age (probably 1 years ago) with residual right sided weakness and pain on Right side and on ASA, also diagnoses with DVT 10 days ago on Xarelto presented to ED s/p fall on Saturday, -HT, -LOC, +AC and +ASA. She fell on R side and striked her R cheek on floor. Since the fall, both pain and weakness has been increased and pt is unable to perform her activity of daily life as she was performing at baseline like using walker. She is complaining of pain all over body but more on R side. Pt does not remember well how she fell. Daughter reports confusion since fall. Pt is alert and oriented x 3 at this time.    Finnish speaking only     Home Meds: Home Medications:  Aspir 81 oral delayed release tablet: 1 tab(s) orally once a day (19 Feb 2019 19:14)  Eliquis 2.5 mg oral tablet: 1 tab(s) orally 2 times a day (19 Feb 2019 19:14)  losartan 100 mg oral tablet: 1 tab(s) orally once a day (19 Feb 2019 19:14)  Reglan 10 mg oral tablet: 1 tab(s) orally once a day, As Needed (19 Feb 2019 19:14)    Allergies: Allergies    No Known Allergies    Intolerances      Soc:   Advanced Directives: Presumed Full Code     ROS:    REVIEW OF SYSTEMS    [x ] A ten-point review of systems was otherwise negative except as noted.  [ ] Due to altered mental status/intubation, subjective information were not able to be obtained from the patient. History was obtained, to the extent possible, from review of the chart and collateral sources of information.      CURRENT MEDICATIONS:   --------------------------------------------------------------------------------------  Neurologic Medications  acetaminophen   Tablet .. 650 milliGRAM(s) Oral every 6 hours PRN Mild Pain (1 - 3)  ibuprofen  Tablet. 400 milliGRAM(s) Oral every 6 hours PRN Mild Pain (1 - 3)    Respiratory Medications    Cardiovascular Medications  Losartan 100mg  ASA 81 mg     Gastrointestinal Medications    Genitourinary Medications    Hematologic/Oncologic Medications    Antimicrobial/Immunologic Medications    Endocrine/Metabolic Medications    Topical/Other Medications  lidocaine   Patch 1 Patch Transdermal Once    --------------------------------------------------------------------------------------    VITAL SIGNS, INS/OUTS (last 24 hours):  --------------------------------------------------------------------------------------  ICU Vital Signs Last 24 Hrs  T(C): 36.7 (19 Feb 2019 14:05), Max: 36.7 (19 Feb 2019 13:55)  T(F): 98 (19 Feb 2019 14:05), Max: 98.1 (19 Feb 2019 13:55)  HR: 85 (19 Feb 2019 14:05) (80 - 85)  BP: 185/90 (19 Feb 2019 14:05) (167/77 - 188/78)  BP(mean): --  ABP: --  ABP(mean): --  RR: 18 (19 Feb 2019 14:05) (18 - 20)  SpO2: 99% (19 Feb 2019 14:05) (98% - 99%)    I&O's Summary    --------------------------------------------------------------------------------------    EXAM:  General/Neuro  GCS: 15  Exam: Normal, NAD, alert, oriented x 2, no focal deficits. PERRLA      Respiratory  Exam: Lungs clear to auscultation      Cardiovascular  Exam: S1, S2.  Regular rate and rhythm.   Cardiac Rhythm: Normal Sinus Rhythm    GI  Exam: Abdomen soft, Non-tender, Non-distended.    Current Diet:  dash      Tubes/Lines/Drains  ***  [x] Peripheral IV      Extremities  Exam: Extremities warm, pink, well-perfused.        Derm:  Exam: Good skin turgor, no skin breakdown.      :   Exam: Shirley catheter in place.     LABS  --------------------------------------------------------------------------------------  Labs:  CAPILLARY BLOOD GLUCOSE      POCT Blood Glucose.: 105 mg/dL (19 Feb 2019 13:59)                          13.1   11.48 )-----------( 283      ( 19 Feb 2019 14:10 )             39.5       Auto Neutrophil %: 70.6 % (02-19-19 @ 14:10)  Auto Immature Granulocyte %: 0.3 % (02-19-19 @ 14:10)    02-19    140  |  99  |  22<H>  ----------------------------<  130<H>  4.6   |  22  |  1.2      Calcium, Total Serum: 9.1 mg/dL (02-19-19 @ 14:10)      LFTs:             7.3  | 0.6  | 18       ------------------[113     ( 19 Feb 2019 14:10 )  4.0  | 0.2  | 9           Lipase:46     Amylase:x         Lactate, Blood: 1.3 mmol/L (02-19-19 @ 14:10)  Blood Gas Venous - Lactate: 1.3 mmoL/L (02-19-19 @ 13:46)      Coags:     13.40  ----< 1.17    ( 19 Feb 2019 14:10 )     28.7        CARDIAC MARKERS ( 19 Feb 2019 14:10 )  x     / <0.01 ng/mL / x     / x     / x            Alcohol, Blood: <10 mg/dL (02-19-19 @ 14:10)          --------------------------------------------------------------------------------------    OTHER LABS    IMAGING RESULTS     < from: Xray Chest 1 View AP/PA (02.19.19 @ 14:59) >  Impression:      Developing right-sidedairspace opacity.    < end of copied text >    < from: CT Angio Abdomen and Pelvis w/ IV Cont (02.19.19 @ 14:49) >    IMPRESSION:     1. No definite evidence of acute traumatic injury within the chest,   abdomen or pelvis.     2. New or increased focal patchy opacity within the right upper lobe,   possibly reflecting pneumonia in the appropriate clinical setting;   follow-up to resolution is suggested.     3. Age-indeterminate nondisplaced fractures of the left 4th - 6th   anterolateral ribs and age-indeterminate compression deformity of the L3   vertebral body.       < end of copied text >    < from: CT Angio Chest w/ IV Cont (02.19.19 @ 14:49) >  IMPRESSION:     1. No definite evidence of acute traumatic injury within the chest,   abdomen or pelvis.     2. New or increased focal patchy opacity within the right upper lobe,   possibly reflecting pneumonia in the appropriate clinical setting;   follow-up to resolution is suggested.     3. Age-indeterminate nondisplaced fractures of the left 4th - 6th   anterolateral ribs and age-indeterminate compression deformity of the L3   vertebral body.     < end of copied text >    < from: CT Head No Cont (02.19.19 @ 14:49) >  IMPRESSION:    In comparison with the prior noncontrast CT scan of the brain dated   January 2, 2018:    No acute intracranial hemorrhage.    Stable examination.    < end of copied text >    < from: CT Cervical Spine No Cont (02.19.19 @ 14:49) >  IMPRESSION:    In comparison with the prior noncontrast CT scan of the cervical spine   dated October 5, 2014:    No acute fracture demonstrated.    Stable examination.    < end of copied text >      ASSESSMENT:  89y F s/p mechanical fall, with right sided weakness. MHx of Stroke on ASA and DVCT on Xarelto s/p fall, -HT, ?LOC, +AC, +ASA came with 2 cm abrasion on Right cheek    PLAN:   Neurologic: pain control with tylenol and motrin   Respiratory: decreased IS, pulling 750ml, with multiple rib fx   Cardiovascular: ASA and Losartan for pmhx of HTN  Gastrointestinal/Nutrition: diet DASH  Renal/Genitourinary: no shirley, strict I/Os   Hematologic:  c/w with eliquis for DVT  Infectious Disease: possible infiltrate to RUL, c/w azithromycin and ceftriaxone   Lines/Tubes: PIV  Endocrine: no dx   Disposition: ICU for monitoring

## 2019-02-19 NOTE — H&P ADULT - NSHPPHYSICALEXAM_GEN_ALL_CORE
General: NAD, AAOx3, calm and cooperative  HEENT: NCAT, Neck supple, 2 cm abrasion is noted on R cheek  Cardiac: RRR S1, S2  Respiratory: CTAB, normal respiratory effort, breath sounds equal BL  Abdomen: Soft, non-distended, non-tender, +bowel sounds  Musculoskeletal: LLE swelling w/ gastrocnemius tenderness  Neuro: Grossly intact  Skin: Warm/dry, normal color, no jaundice

## 2019-02-20 LAB
ALBUMIN SERPL ELPH-MCNC: 3.4 G/DL — LOW (ref 3.5–5.2)
ALP SERPL-CCNC: 94 U/L — SIGNIFICANT CHANGE UP (ref 30–115)
ALT FLD-CCNC: 8 U/L — SIGNIFICANT CHANGE UP (ref 0–41)
ANION GAP SERPL CALC-SCNC: 19 MMOL/L — HIGH (ref 7–14)
APTT BLD: 32.8 SEC — SIGNIFICANT CHANGE UP (ref 27–39.2)
AST SERPL-CCNC: 15 U/L — SIGNIFICANT CHANGE UP (ref 0–41)
BASOPHILS # BLD AUTO: 0.06 K/UL — SIGNIFICANT CHANGE UP (ref 0–0.2)
BASOPHILS NFR BLD AUTO: 0.6 % — SIGNIFICANT CHANGE UP (ref 0–1)
BILIRUB SERPL-MCNC: 0.5 MG/DL — SIGNIFICANT CHANGE UP (ref 0.2–1.2)
BUN SERPL-MCNC: 25 MG/DL — HIGH (ref 10–20)
CALCIUM SERPL-MCNC: 8.5 MG/DL — SIGNIFICANT CHANGE UP (ref 8.5–10.1)
CHLORIDE SERPL-SCNC: 101 MMOL/L — SIGNIFICANT CHANGE UP (ref 98–110)
CK MB CFR SERPL CALC: 3 NG/ML — SIGNIFICANT CHANGE UP (ref 0.6–6.3)
CK MB CFR SERPL CALC: 4.1 NG/ML — SIGNIFICANT CHANGE UP (ref 0.6–6.3)
CK SERPL-CCNC: 204 U/L — SIGNIFICANT CHANGE UP (ref 0–225)
CK SERPL-CCNC: 61 U/L — SIGNIFICANT CHANGE UP (ref 0–225)
CO2 SERPL-SCNC: 20 MMOL/L — SIGNIFICANT CHANGE UP (ref 17–32)
CREAT SERPL-MCNC: 1.2 MG/DL — SIGNIFICANT CHANGE UP (ref 0.7–1.5)
EOSINOPHIL # BLD AUTO: 0.23 K/UL — SIGNIFICANT CHANGE UP (ref 0–0.7)
EOSINOPHIL NFR BLD AUTO: 2.1 % — SIGNIFICANT CHANGE UP (ref 0–8)
GLUCOSE SERPL-MCNC: 103 MG/DL — HIGH (ref 70–99)
HCT VFR BLD CALC: 35.8 % — LOW (ref 37–47)
HGB BLD-MCNC: 12 G/DL — SIGNIFICANT CHANGE UP (ref 12–16)
IMM GRANULOCYTES NFR BLD AUTO: 0.3 % — SIGNIFICANT CHANGE UP (ref 0.1–0.3)
INR BLD: 1.39 RATIO — HIGH (ref 0.65–1.3)
LYMPHOCYTES # BLD AUTO: 3.39 K/UL — SIGNIFICANT CHANGE UP (ref 1.2–3.4)
LYMPHOCYTES # BLD AUTO: 31.7 % — SIGNIFICANT CHANGE UP (ref 20.5–51.1)
MAGNESIUM SERPL-MCNC: 2.3 MG/DL — SIGNIFICANT CHANGE UP (ref 1.8–2.4)
MCHC RBC-ENTMCNC: 30.6 PG — SIGNIFICANT CHANGE UP (ref 27–31)
MCHC RBC-ENTMCNC: 33.5 G/DL — SIGNIFICANT CHANGE UP (ref 32–37)
MCV RBC AUTO: 91.3 FL — SIGNIFICANT CHANGE UP (ref 81–99)
MONOCYTES # BLD AUTO: 1.47 K/UL — HIGH (ref 0.1–0.6)
MONOCYTES NFR BLD AUTO: 13.7 % — HIGH (ref 1.7–9.3)
NEUTROPHILS # BLD AUTO: 5.52 K/UL — SIGNIFICANT CHANGE UP (ref 1.4–6.5)
NEUTROPHILS NFR BLD AUTO: 51.6 % — SIGNIFICANT CHANGE UP (ref 42.2–75.2)
NRBC # BLD: 0 /100 WBCS — SIGNIFICANT CHANGE UP (ref 0–0)
PHOSPHATE SERPL-MCNC: 4.4 MG/DL — SIGNIFICANT CHANGE UP (ref 2.1–4.9)
PLATELET # BLD AUTO: 279 K/UL — SIGNIFICANT CHANGE UP (ref 130–400)
POTASSIUM SERPL-MCNC: 4.1 MMOL/L — SIGNIFICANT CHANGE UP (ref 3.5–5)
POTASSIUM SERPL-SCNC: 4.1 MMOL/L — SIGNIFICANT CHANGE UP (ref 3.5–5)
PROT SERPL-MCNC: 6.2 G/DL — SIGNIFICANT CHANGE UP (ref 6–8)
PROTHROM AB SERPL-ACNC: 15.9 SEC — HIGH (ref 9.95–12.87)
RBC # BLD: 3.92 M/UL — LOW (ref 4.2–5.4)
RBC # FLD: 13.2 % — SIGNIFICANT CHANGE UP (ref 11.5–14.5)
SODIUM SERPL-SCNC: 140 MMOL/L — SIGNIFICANT CHANGE UP (ref 135–146)
TROPONIN T SERPL-MCNC: <0.01 NG/ML — SIGNIFICANT CHANGE UP
TROPONIN T SERPL-MCNC: <0.01 NG/ML — SIGNIFICANT CHANGE UP
WBC # BLD: 10.7 K/UL — SIGNIFICANT CHANGE UP (ref 4.8–10.8)
WBC # FLD AUTO: 10.7 K/UL — SIGNIFICANT CHANGE UP (ref 4.8–10.8)

## 2019-02-20 PROCEDURE — 93880 EXTRACRANIAL BILAT STUDY: CPT | Mod: 26

## 2019-02-20 RX ADMIN — APIXABAN 2.5 MILLIGRAM(S): 2.5 TABLET, FILM COATED ORAL at 06:15

## 2019-02-20 RX ADMIN — Medication 600 MILLIGRAM(S): at 18:10

## 2019-02-20 RX ADMIN — Medication 650 MILLIGRAM(S): at 18:10

## 2019-02-20 RX ADMIN — Medication 600 MILLIGRAM(S): at 06:15

## 2019-02-20 RX ADMIN — Medication 650 MILLIGRAM(S): at 01:50

## 2019-02-20 RX ADMIN — Medication 600 MILLIGRAM(S): at 11:34

## 2019-02-20 RX ADMIN — OXYCODONE HYDROCHLORIDE 5 MILLIGRAM(S): 5 TABLET ORAL at 22:30

## 2019-02-20 RX ADMIN — CHLORHEXIDINE GLUCONATE 1 APPLICATION(S): 213 SOLUTION TOPICAL at 06:03

## 2019-02-20 RX ADMIN — Medication 600 MILLIGRAM(S): at 23:15

## 2019-02-20 RX ADMIN — LIDOCAINE 1 PATCH: 4 CREAM TOPICAL at 06:38

## 2019-02-20 RX ADMIN — Medication 650 MILLIGRAM(S): at 18:54

## 2019-02-20 RX ADMIN — Medication 600 MILLIGRAM(S): at 18:55

## 2019-02-20 RX ADMIN — Medication 650 MILLIGRAM(S): at 06:15

## 2019-02-20 RX ADMIN — Medication 81 MILLIGRAM(S): at 11:33

## 2019-02-20 RX ADMIN — Medication 600 MILLIGRAM(S): at 01:50

## 2019-02-20 RX ADMIN — LOSARTAN POTASSIUM 100 MILLIGRAM(S): 100 TABLET, FILM COATED ORAL at 06:15

## 2019-02-20 RX ADMIN — Medication 650 MILLIGRAM(S): at 23:00

## 2019-02-20 RX ADMIN — OXYCODONE HYDROCHLORIDE 5 MILLIGRAM(S): 5 TABLET ORAL at 21:42

## 2019-02-20 RX ADMIN — Medication 650 MILLIGRAM(S): at 12:13

## 2019-02-20 RX ADMIN — PANTOPRAZOLE SODIUM 40 MILLIGRAM(S): 20 TABLET, DELAYED RELEASE ORAL at 06:16

## 2019-02-20 RX ADMIN — Medication 600 MILLIGRAM(S): at 12:13

## 2019-02-20 RX ADMIN — Medication 600 MILLIGRAM(S): at 12:14

## 2019-02-20 RX ADMIN — Medication 650 MILLIGRAM(S): at 11:33

## 2019-02-20 NOTE — CONSULT NOTE ADULT - ASSESSMENT
IMPRESSION: Rehab of gait ab fall l rib fx old CVA    PRECAUTIONS: [    ] Cardiac  [  x  ] Respiratory  [    ] Seizures [    ] Contact Isolation  [    ] Droplet Isolation  [    ] Other    Weight Bearing Status:     RECOMMENDATION:    Out of Bed to Chair     DVT/Decubiti Prophylaxis    REHAB PLAN:     [   x  ] Bedside P/T 3-5 times a week   [     ] Bedside O/T  2-3 times a week   [     ] No Rehab Therapy Indicated   [     ]  Speech Therapy   Conditioning/ROM                                 ADL  Bed Mobility                                            Conditioning/ROM  Transfers                                                  Bed Mobility  Sitting /Standing Balance                      Transfers                                        Gait Training                                            Sitting/Standing Balance  Stair Training [   ]Applicable                 Home equipment Eval                                                                     Splinting  [   ] Only      GOALS:   ADL   [  x  ]   Independent         Transfers  [x    ] Independent            Ambulation  [ x    ] Independent     [  x   ] With device                            [    ]  CG                                               [    ]  CG                                                    [     ] CG                            [    ] Min A                                          [    ] Min A                                                [     ] Min  A          DISCHARGE PLAN:   [     ]  Good candidate for Intensive Rehabilitation/Hospital based                                             Will tolerate 3hrs Intensive Rehab Daily                                       [  x    ]  Short Term Rehab in Skilled Nursing Facility                               VS                                     [   x   ]  Home with Outpatient or VN services                                         [      ]  Possible Candidate for Intensive Hospital based Rehab

## 2019-02-20 NOTE — CONSULT NOTE ADULT - SUBJECTIVE AND OBJECTIVE BOX
Patient is a 89y old  Female who presents with a chief complaint of fall with rib fx (20 Feb 2019 04:09)    HPI:  89 year old F with PMH old stroke of unknown age (probably 1 years ago) with residual weakness and pain on Right side and on ASA, also diagnoses with DVT 10 days ago on Xarelto presented to ED s/p fall on Saturday, -HT, -LOC, +AC and +ASA. She fell on R side and striked her R cheek on floor. Since the fall, both pain and weakness has been increased and pt is unable to perform her activity of daily life as she was performing at baseline like using walker. She is complaining of pain all over body but more on R side. Pt does not remember well how she fell. Daughter reports confusion since fall. Pt is alert and oriented x 3 at this time. (19 Feb 2019 19:27)      PAST MEDICAL & SURGICAL HISTORY:  DVT (deep venous thrombosis)  Hyperlipemia      Hospital Course: trauma [facility] with L 4-6 rib fx    TODAY'S SUBJECTIVE & REVIEW OF SYMPTOMS:     Constitutional WNL   Cardio WNL   Resp WNL   GI WNL  Heme WNL  Endo WNL  Skin WNL  MSK Pain  Neuro WNL  Cognitive WNL  Psych WNL      MEDICATIONS  (STANDING):  acetaminophen   Tablet .. 650 milliGRAM(s) Oral every 6 hours  aspirin enteric coated 81 milliGRAM(s) Oral daily  chlorhexidine 4% Liquid 1 Application(s) Topical <User Schedule>  ibuprofen  Tablet. 600 milliGRAM(s) Oral every 6 hours  losartan 100 milliGRAM(s) Oral daily  pantoprazole    Tablet 40 milliGRAM(s) Oral before breakfast    MEDICATIONS  (PRN):  metoclopramide 10 milliGRAM(s) Oral daily PRN GERD symptoms  oxyCODONE    IR 5 milliGRAM(s) Oral every 6 hours PRN Severe Pain (7 - 10)      FAMILY HISTORY:      Allergies    No Known Allergies    Intolerances        SOCIAL HISTORY:    [    ] Etoh  [    ] Smoking  [    ] Substance abuse     Home Environment:  [    ] Home Alone  [  x  ] Lives with Family  [    ] Home Health Aid    Dwelling:  [    ] Apartment  [  x  ] Private House  [    ] Adult Home  [    ] Skilled Nursing Facility      [    ] Short Term  [    ] Long Term  [    ] Stairs                           [    ] Elevator     FUNCTIONAL STATUS PTA: (Check all that apply)  Ambulation: [   x  ]Independent    [    ] Dependent     [    ] Non-Ambulatory  Assistive Device: [    ] SA Cane  [    ]  Q Cane  [ x   ] Walker  [    ]  Wheelchair  ADL : [    ] Independent  [    ]  Dependent       Vital Signs Last 24 Hrs  T(C): 36.2 (20 Feb 2019 09:00), Max: 36.7 (19 Feb 2019 20:35)  T(F): 97.1 (20 Feb 2019 09:00), Max: 98 (19 Feb 2019 20:35)  HR: 69 (20 Feb 2019 09:00) (69 - 88)  BP: 137/68 (20 Feb 2019 09:00) (120/60 - 181/86)  BP(mean): --  RR: 18 (20 Feb 2019 09:00) (18 - 18)  SpO2: 97% (20 Feb 2019 09:00) (95% - 98%)      PHYSICAL EXAM: Alert Follows commands  GENERAL: NAD, well-groomed, well-developed  HEAD: Normocephalic R facial abrasion  EYES: EOMI, PERRLA, conjunctiva and sclera clear  NECK: Supple  CHEST/LUNG: Clear bilaterally  HEART: Regular rate and rhythm  ABDOMEN: Soft, Nontender, Nondistended; Bowel sounds present  EXTREMITIES:  no calf tenderness,no edema BLES    NERVOUS SYSTEM:  Cranial Nerves 2-12 intact [ x   ] Abnormal  [    ]  ROM: WFL all extremities [   x ]  Abnormal [     ]  Motor Strength: WFL all extremities  [   x ]  Abnormal [    ]3-4/5  Sensation: intact to light touch [  x  ] Abnormal [    ]      FUNCTIONAL STATUS:  Bed Mobility: [   ]  Independent [    ]  Supervision [ x   ]  Needs Assistance [  ]  N/A  Transfers: [    ]  Independent [    ]  Supervision [   x ]  Needs Assistance [    ]  N/A    Ambulation:  [    ]  Independent [    ]  Supervision [    ]  Needs Assistance [  x  ]  N/A   ADL:  [    ]   Independent [ x   ] Requires Assistance [    ] N/A       LABS:                        12.0   10.70 )-----------( 279      ( 20 Feb 2019 00:23 )             35.8     02-20    140  |  101  |  25<H>  ----------------------------<  103<H>  4.1   |  20  |  1.2    Ca    8.5      20 Feb 2019 00:23  Phos  4.4     02-20  Mg     2.3     02-20    TPro  6.2  /  Alb  3.4<L>  /  TBili  0.5  /  DBili  x   /  AST  15  /  ALT  8   /  AlkPhos  94  02-20    PT/INR - ( 20 Feb 2019 00:23 )   PT: 15.90 sec;   INR: 1.39 ratio         PTT - ( 20 Feb 2019 00:23 )  PTT:32.8 sec      RADIOLOGY & ADDITIONAL STUDIES:

## 2019-02-20 NOTE — CHART NOTE - NSCHARTNOTEFT_GEN_A_CORE
NilsBindu vega    89 y/o y/o s/p fall on Saturday, p/w increased R sided pain    neuro: Hx of CVA w/ R sided weakness, intact, pain control w/ tylenol/motrin, oxy prn, syncope workup pending, EEG, carotid US  Resp: sating well on RA, pulling 750cc IS, c/o of b/l chest rib pain.  CVS: Hx of DVT, HLD. restarted home ASA, losartan, holding Eliquis as per primary team attending   -CE neg x2, f/u 3rd set at 1600  -2d echo pending  GI: on Dash diet, PPI  : voiding, IVL  Msk: PT pending    f/u  -full set of evening labs   -am cxr    Full sign out given to primary team Dr Darby

## 2019-02-20 NOTE — PROGRESS NOTE ADULT - ASSESSMENT
ASSESSMENT:  89y F with L4-6 anterolateral rib frx and age indeterminant L3 compression frx s/p mechanical fall, h/o CVA on ASA and DVT on Xarelto, 2 cm abrasion on Right cheek    PLAN:   Neurologic: H/O CVA w/ residual R sided weakness, dementia, acute traumatic pain 2/2 rib frx sustained from fall, pain control with tylenol and motrin ATC, breakthrough pain orders PRN oxycodone  Syncope W/U: multiple prior falls, Duplex carotids, Echo, EKG, EEG  Respiratory: S/P fall with multiple L sided rib frx 4-6, IS: pulling 750ml, monitor respiratory status, pulse oxymetry, O2NC PRN, maintain O2Sat >90%, AM CXR, RUL opacity, concern for poss. PNA  Cardiovascular: H/O HTN, continue home ASA and Losartan, monitor hemodynamics, maintain SBP >100  Gastrointestinal/Nutrition: no dx, DASH diet, PPI for ppx, bowel regimen Colace, senna  Renal/Genitourinary: no dx, no ABDULLAHI, voiding freely, no shirley, strict I/Os, IVL  Hematologic: H/O recent dx of RLE DVT on Eliquis @home, continue AC, DVT study, Monitor H/H s/p fall on eliquis/ASA  Infectious Disease: possible infiltrate to RUL, c/w azithromycin and ceftriaxone for presumed PNA, trend WBC  Lines/Tubes: PIV  Endocrine: no dx, monitor BG with FS  MSK: age indeterminate L3 compression frx likely from prior fall, no point tenderness to correlate with acute injury    Disposition: Downgrade to floor

## 2019-02-20 NOTE — PROGRESS NOTE ADULT - SUBJECTIVE AND OBJECTIVE BOX
LYLY ALEXANDER  [id number]  89y Female    Indication for ICU admission: L4-6 anterolateral rib frx s/p mechanical fall, h/o CVA on ASA and DVT on Xarelto, 2 cm abrasion on Right cheek  Admit Date: 02-19-19  ICU Date: 2/19/19    Allergies:   No Known Allergies    PAST MEDICAL & SURGICAL HISTORY:  DVT (deep venous thrombosis)  Hyperlipemia    Home Medications:  Aspir 81 oral delayed release tablet: 1 tab(s) orally once a day (19 Feb 2019 19:14)  Eliquis 2.5 mg oral tablet: 1 tab(s) orally 2 times a day (19 Feb 2019 19:14)  losartan 100 mg oral tablet: 1 tab(s) orally once a day (19 Feb 2019 19:14)  Reglan 10 mg oral tablet: 1 tab(s) orally once a day, As Needed (19 Feb 2019 19:14)    24HRS EVENT:  Neurologic: H/O CVA w/ residual R sided weakness, dementia, acute traumatic pain 2/2 rib frx sustained from fall, pain control with tylenol and motrin ATC, breakthrough pain orders PRN oxycodone  Syncope W/U: multiple prior falls, Duplex carotids, Echo, EKG, EEG  Respiratory: S/P fall with multiple L sided rib frx 4-6, IS: pulling 750ml, monitor respiratory status, pulse oxymetry, O2NC PRN, maintain O2Sat >90%, AM CXR, RUL opacity, concern for poss. PNA  Cardiovascular: H/O HTN, continue home ASA and Losartan, monitor hemodynamics, maintain SBP >100  Gastrointestinal/Nutrition: no dx, DASH diet, PPI for ppx, bowel regimen Colace, senna  Renal/Genitourinary: no dx, no ABDULLAHI, voiding freely, no shirley, strict I/Os, IVL  Hematologic: H/O recent dx of RLE DVT on Eliquis @home, continue AC, DVT study, Monitor H/H s/p fall on eliquis/ASA  Infectious Disease: possible infiltrate to RUL, c/w azithromycin and ceftriaxone for presumed PNA, trend WBC  Lines/Tubes: PIV  Endocrine: no dx, monitor BG with FS  MSK: age indeterminate L3 compression frx likely from prior fall, no point tenderness to correlate with acute injury    DVT PTX: Eliquis, ASA  GI PTX: PPI    Code Status: Full code    ***Tubes/Lines/Drains  ***  Peripheral IV    REVIEW OF SYSTEMS  [x] A ten-point review of systems was otherwise negative except as noted.  [ ] Due to altered mental status/intubation, subjective information were not able to be obtained from the patient. History was obtained, to the extent possible, from review of the chart and collateral sources of information.

## 2019-02-21 LAB
ANION GAP SERPL CALC-SCNC: 19 MMOL/L — HIGH (ref 7–14)
APTT BLD: 33.2 SEC — SIGNIFICANT CHANGE UP (ref 27–39.2)
BUN SERPL-MCNC: 32 MG/DL — HIGH (ref 10–20)
CALCIUM SERPL-MCNC: 8.8 MG/DL — SIGNIFICANT CHANGE UP (ref 8.5–10.1)
CHLORIDE SERPL-SCNC: 98 MMOL/L — SIGNIFICANT CHANGE UP (ref 98–110)
CO2 SERPL-SCNC: 22 MMOL/L — SIGNIFICANT CHANGE UP (ref 17–32)
CREAT SERPL-MCNC: 1.8 MG/DL — HIGH (ref 0.7–1.5)
GLUCOSE SERPL-MCNC: 111 MG/DL — HIGH (ref 70–99)
HCT VFR BLD CALC: 36.6 % — LOW (ref 37–47)
HGB BLD-MCNC: 11.9 G/DL — LOW (ref 12–16)
INR BLD: 1.14 RATIO — SIGNIFICANT CHANGE UP (ref 0.65–1.3)
MAGNESIUM SERPL-MCNC: 2.4 MG/DL — SIGNIFICANT CHANGE UP (ref 1.8–2.4)
MCHC RBC-ENTMCNC: 30.3 PG — SIGNIFICANT CHANGE UP (ref 27–31)
MCHC RBC-ENTMCNC: 32.5 G/DL — SIGNIFICANT CHANGE UP (ref 32–37)
MCV RBC AUTO: 93.1 FL — SIGNIFICANT CHANGE UP (ref 81–99)
NRBC # BLD: 0 /100 WBCS — SIGNIFICANT CHANGE UP (ref 0–0)
PHOSPHATE SERPL-MCNC: 4.9 MG/DL — SIGNIFICANT CHANGE UP (ref 2.1–4.9)
PLATELET # BLD AUTO: 287 K/UL — SIGNIFICANT CHANGE UP (ref 130–400)
POTASSIUM SERPL-MCNC: 4.2 MMOL/L — SIGNIFICANT CHANGE UP (ref 3.5–5)
POTASSIUM SERPL-SCNC: 4.2 MMOL/L — SIGNIFICANT CHANGE UP (ref 3.5–5)
PROTHROM AB SERPL-ACNC: 13.1 SEC — HIGH (ref 9.95–12.87)
RBC # BLD: 3.93 M/UL — LOW (ref 4.2–5.4)
RBC # FLD: 13 % — SIGNIFICANT CHANGE UP (ref 11.5–14.5)
SODIUM SERPL-SCNC: 139 MMOL/L — SIGNIFICANT CHANGE UP (ref 135–146)
WBC # BLD: 8.04 K/UL — SIGNIFICANT CHANGE UP (ref 4.8–10.8)
WBC # FLD AUTO: 8.04 K/UL — SIGNIFICANT CHANGE UP (ref 4.8–10.8)

## 2019-02-21 PROCEDURE — 99233 SBSQ HOSP IP/OBS HIGH 50: CPT

## 2019-02-21 RX ORDER — LABETALOL HCL 100 MG
10 TABLET ORAL ONCE
Qty: 0 | Refills: 0 | Status: COMPLETED | OUTPATIENT
Start: 2019-02-21 | End: 2019-02-21

## 2019-02-21 RX ORDER — LIDOCAINE 4 G/100G
1 CREAM TOPICAL DAILY
Qty: 0 | Refills: 0 | Status: DISCONTINUED | OUTPATIENT
Start: 2019-02-21 | End: 2019-02-22

## 2019-02-21 RX ADMIN — Medication 650 MILLIGRAM(S): at 06:53

## 2019-02-21 RX ADMIN — Medication 650 MILLIGRAM(S): at 13:47

## 2019-02-21 RX ADMIN — Medication 650 MILLIGRAM(S): at 18:25

## 2019-02-21 RX ADMIN — PANTOPRAZOLE SODIUM 40 MILLIGRAM(S): 20 TABLET, DELAYED RELEASE ORAL at 06:53

## 2019-02-21 RX ADMIN — Medication 600 MILLIGRAM(S): at 00:00

## 2019-02-21 RX ADMIN — LOSARTAN POTASSIUM 100 MILLIGRAM(S): 100 TABLET, FILM COATED ORAL at 06:53

## 2019-02-21 RX ADMIN — CHLORHEXIDINE GLUCONATE 1 APPLICATION(S): 213 SOLUTION TOPICAL at 06:54

## 2019-02-21 RX ADMIN — Medication 81 MILLIGRAM(S): at 13:52

## 2019-02-21 RX ADMIN — Medication 600 MILLIGRAM(S): at 06:53

## 2019-02-21 RX ADMIN — Medication 10 MILLIGRAM(S): at 23:51

## 2019-02-21 RX ADMIN — Medication 650 MILLIGRAM(S): at 00:00

## 2019-02-21 RX ADMIN — Medication 600 MILLIGRAM(S): at 13:52

## 2019-02-21 NOTE — PROGRESS NOTE ADULT - SUBJECTIVE AND OBJECTIVE BOX
Progress Note: Trauma Surgery  Patient: LYLY ALEXANDER , 89y (04-Apr-1922)Female   MRN: [id number]  Location: [address]  Visit: 02-19-19 Inpatient  Date: 02-21-19 @ 10:56  Hospital Day: 3    Procedure/Injury: s/p fall, +HT, -LOC, +AC  Events over 24h: No acute event overnight. No new complaint. Pt is vitally stable but BP is very high but pt is asymptomatic. On DASH diet, tolerating well. Denies nausea, vomiting, and/or abdominal pain. Ambulating adequately. Voiding adequately. +Flatus, -Bowel movement.    Vitals: T(F): 98.6 (02-21-19 @ 08:11), Max: 98.6 (02-21-19 @ 08:11)  HR: 79 (02-21-19 @ 08:11)  BP: 198/88 (02-21-19 @ 08:11) (142/68 - 198/88)  RR: 19 (02-21-19 @ 08:11)  SpO2: 98% (02-21-19 @ 08:11)    Diet: Diet, DASH/TLC:   Sodium & Cholesterol Restricted (02-19-19 @ 19:31)    In:   02-20-19 @ 07:01  -  02-21-19 @ 07:00  --------------------------------------------------------  IN: 0 mL      Out:   02-20-19 @ 07:01  -  02-21-19 @ 07:00  --------------------------------------------------------  OUT:    Voided: 400 mL  Total OUT: 400 mL        Net:   02-20-19 @ 07:01  -  02-21-19 @ 07:00  --------------------------------------------------------  NET: -400 mL        Physical Examination:  General Appearance: NAD, alert and cooperative  HEENT: NCAT, WNL  Heart: S1 and S2.   Lungs: Clear to auscultation BL  Abdomen: Positive bowel sounds. Soft, nondistended, nontender.     Medications: [Standing]  acetaminophen   Tablet .. 650 milliGRAM(s) Oral every 6 hours  aspirin enteric coated 81 milliGRAM(s) Oral daily  chlorhexidine 4% Liquid 1 Application(s) Topical <User Schedule>  ibuprofen  Tablet. 600 milliGRAM(s) Oral every 6 hours  losartan 100 milliGRAM(s) Oral daily  pantoprazole    Tablet 40 milliGRAM(s) Oral before breakfast    Medications:[PRN]  metoclopramide 10 milliGRAM(s) Oral daily PRN  oxyCODONE    IR 5 milliGRAM(s) Oral every 6 hours PRN    Labs:                        11.9   8.04  )-----------( 287      ( 21 Feb 2019 00:50 )             36.6   02-21    139  |  98  |  32<H>  ----------------------------<  111<H>  4.2   |  22  |  1.8<H>    Ca    8.8      21 Feb 2019 00:50  Phos  4.9     02-21  Mg     2.4     02-21    TPro  6.2  /  Alb  3.4<L>  /  TBili  0.5  /  DBili  x   /  AST  15  /  ALT  8   /  AlkPhos  94  02-20  LIVER FUNCTIONS - ( 20 Feb 2019 00:23 )  Alb: 3.4 g/dL / Pro: 6.2 g/dL / ALK PHOS: 94 U/L / ALT: 8 U/L / AST: 15 U/L / GGT: x         PT/INR - ( 21 Feb 2019 00:50 )   PT: 13.10 sec;   INR: 1.14 ratio         PTT - ( 21 Feb 2019 00:50 )  PTT:33.2 secCARDIAC MARKERS ( 20 Feb 2019 16:53 )  x     / <0.01 ng/mL / 204 U/L / x     / 4.1 ng/mL  CARDIAC MARKERS ( 20 Feb 2019 09:30 )  x     / <0.01 ng/mL / 61 U/L / x     / 3.0 ng/mL  CARDIAC MARKERS ( 19 Feb 2019 14:10 )  x     / <0.01 ng/mL / x     / x     / x          Micro/Urine:    Imaging:  None/24h

## 2019-02-21 NOTE — CONSULT NOTE ADULT - SUBJECTIVE AND OBJECTIVE BOX
HPI:  89 year old F with PMH old stroke of unknown age (probably 1 years ago) with residual weakness and pain on Right side and on ASA, also diagnoses with DVT 10 days ago on Xarelto presented to ED s/p fall on Saturday, -HT, -LOC, +AC and +ASA. She fell on R side and striked her R cheek on floor. Since the fall, both pain and weakness has been increased and pt is unable to perform her activity of daily life as she was performing at baseline like using walker. She is complaining of pain all over body but more on R side. Pt does not remember well how she fell. Daughter reports confusion since fall. Pt is alert and oriented x 3 at this time. (19 Feb 2019 19:27)    PAST MEDICAL & SURGICAL HISTORY:  DVT (deep venous thrombosis)  Hyperlipemia    Allergies    No Known Allergies    Intolerances    Vital Signs Last 24 Hrs  T(C): 37 (21 Feb 2019 08:11), Max: 37 (21 Feb 2019 08:11)  T(F): 98.6 (21 Feb 2019 08:11), Max: 98.6 (21 Feb 2019 08:11)  HR: 79 (21 Feb 2019 08:11) (61 - 83)  BP: 198/88 (21 Feb 2019 08:11) (142/68 - 198/88)  BP(mean): --  RR: 19 (21 Feb 2019 08:11) (16 - 19)  SpO2: 98% (21 Feb 2019 08:11) (98% - 99%)    CBC Full  -  ( 21 Feb 2019 00:50 )  WBC Count : 8.04 K/uL  Hemoglobin : 11.9 g/dL  Hematocrit : 36.6 %  Platelet Count - Automated : 287 K/uL  Mean Cell Volume : 93.1 fL  Mean Cell Hemoglobin : 30.3 pg  Mean Cell Hemoglobin Concentration : 32.5 g/dL  Auto Neutrophil # : x  Auto Lymphocyte # : x  Auto Monocyte # : x  Auto Eosinophil # : x  Auto Basophil # : x  Auto Neutrophil % : x  Auto Lymphocyte % : x  Auto Monocyte % : x  Auto Eosinophil % : x  Auto Basophil % : x    02-21    139  |  98  |  32<H>  ----------------------------<  111<H>  4.2   |  22  |  1.8<H>    Ca    8.8      21 Feb 2019 00:50  Phos  4.9     02-21  Mg     2.4     02-21    TPro  6.2  /  Alb  3.4<L>  /  TBili  0.5  /  DBili  x   /  AST  15  /  ALT  8   /  AlkPhos  94  02-20      MEDICATIONS  (STANDING):  acetaminophen   Tablet .. 650 milliGRAM(s) Oral every 6 hours  aspirin enteric coated 81 milliGRAM(s) Oral daily  chlorhexidine 4% Liquid 1 Application(s) Topical <User Schedule>  ibuprofen  Tablet. 600 milliGRAM(s) Oral every 6 hours  lidocaine   Patch 1 Patch Transdermal daily  losartan 100 milliGRAM(s) Oral daily  pantoprazole    Tablet 40 milliGRAM(s) Oral before breakfast    MEDICATIONS  (PRN):  metoclopramide 10 milliGRAM(s) Oral daily PRN GERD symptoms  oxyCODONE    IR 5 milliGRAM(s) Oral every 6 hours PRN Severe Pain (7 - 10)

## 2019-02-21 NOTE — PROGRESS NOTE ADULT - ASSESSMENT
Assessment:  89y Female patient admitted S/P s/p fall, +HT, -LOC, +AC, with the above physical exam, labs, and imaging findings.    Plan:  -Dispo: SNF vs VNS  -F?U syncope work-up  -Pain control as needed  -Hemodynamic monitoring as per routine  -Encourage ambulation and incentive spirometer use (10x/hr when awake)  -GI and DVT prophylaxis  -Check and replete CBC and BMP q daily  -Strict input and output monitoring  -Continue current management    Date/Time: 02-21-19 @ 10:56

## 2019-02-21 NOTE — CONSULT NOTE ADULT - ASSESSMENT
89 yo female with fall - recently dx'ed with dvt started on eliquis. Trauma workup reveals rib fracture and pt is confused at times.    #fall with rib fracture- incentive spirometry, pain control, pt/rehab  #syncope workup negative to date - pt currently getting echo - follow up on results  EKg reviewed - sinus with pvc, check orthostatics  #recent DVT - eliquis was held on admission - follow up with trauma clearance to restart  #ABDULLAHI - dc motrin 600mg q6 around the clock m, ivfs, hold arb - recheck labs  #debility - SNF placment  #uncontrolled HTN - hold arb in view of ABDULLAHI - can use norvasc for bp control  delirium - speak to family to assess baseline - repeat CT head - may be hospital induced delirium     spoke with trauma resident covering, recall with questions prn.

## 2019-02-21 NOTE — PHYSICAL THERAPY INITIAL EVALUATION ADULT - GENERAL OBSERVATIONS, REHAB EVAL
Patient encountered seated at EOB + IV lock, + tele, NAD patient confused , attempted use of [employer] interpret[employer] Jerry [id number], patient unable to follow conversation. no non verbal indication of pain

## 2019-02-22 ENCOUNTER — TRANSCRIPTION ENCOUNTER (OUTPATIENT)
Age: 84
End: 2019-02-22

## 2019-02-22 VITALS
SYSTOLIC BLOOD PRESSURE: 141 MMHG | DIASTOLIC BLOOD PRESSURE: 72 MMHG | HEART RATE: 73 BPM | RESPIRATION RATE: 18 BRPM | TEMPERATURE: 97 F

## 2019-02-22 LAB
ANION GAP SERPL CALC-SCNC: 19 MMOL/L — HIGH (ref 7–14)
BASOPHILS # BLD AUTO: 0.07 K/UL — SIGNIFICANT CHANGE UP (ref 0–0.2)
BASOPHILS NFR BLD AUTO: 0.9 % — SIGNIFICANT CHANGE UP (ref 0–1)
BUN SERPL-MCNC: 41 MG/DL — HIGH (ref 10–20)
CALCIUM SERPL-MCNC: 8.8 MG/DL — SIGNIFICANT CHANGE UP (ref 8.5–10.1)
CHLORIDE SERPL-SCNC: 103 MMOL/L — SIGNIFICANT CHANGE UP (ref 98–110)
CO2 SERPL-SCNC: 20 MMOL/L — SIGNIFICANT CHANGE UP (ref 17–32)
CREAT SERPL-MCNC: 2 MG/DL — HIGH (ref 0.7–1.5)
EOSINOPHIL # BLD AUTO: 0.19 K/UL — SIGNIFICANT CHANGE UP (ref 0–0.7)
EOSINOPHIL NFR BLD AUTO: 2.4 % — SIGNIFICANT CHANGE UP (ref 0–8)
GLUCOSE SERPL-MCNC: 129 MG/DL — HIGH (ref 70–99)
HCT VFR BLD CALC: 37.2 % — SIGNIFICANT CHANGE UP (ref 37–47)
HGB BLD-MCNC: 12.3 G/DL — SIGNIFICANT CHANGE UP (ref 12–16)
IMM GRANULOCYTES NFR BLD AUTO: 0.4 % — HIGH (ref 0.1–0.3)
LYMPHOCYTES # BLD AUTO: 1.93 K/UL — SIGNIFICANT CHANGE UP (ref 1.2–3.4)
LYMPHOCYTES # BLD AUTO: 24.4 % — SIGNIFICANT CHANGE UP (ref 20.5–51.1)
MAGNESIUM SERPL-MCNC: 2.5 MG/DL — HIGH (ref 1.8–2.4)
MCHC RBC-ENTMCNC: 30.3 PG — SIGNIFICANT CHANGE UP (ref 27–31)
MCHC RBC-ENTMCNC: 33.1 G/DL — SIGNIFICANT CHANGE UP (ref 32–37)
MCV RBC AUTO: 91.6 FL — SIGNIFICANT CHANGE UP (ref 81–99)
MONOCYTES # BLD AUTO: 1.22 K/UL — HIGH (ref 0.1–0.6)
MONOCYTES NFR BLD AUTO: 15.4 % — HIGH (ref 1.7–9.3)
NEUTROPHILS # BLD AUTO: 4.48 K/UL — SIGNIFICANT CHANGE UP (ref 1.4–6.5)
NEUTROPHILS NFR BLD AUTO: 56.5 % — SIGNIFICANT CHANGE UP (ref 42.2–75.2)
NRBC # BLD: 0 /100 WBCS — SIGNIFICANT CHANGE UP (ref 0–0)
PHOSPHATE SERPL-MCNC: 5.2 MG/DL — HIGH (ref 2.1–4.9)
PLATELET # BLD AUTO: 296 K/UL — SIGNIFICANT CHANGE UP (ref 130–400)
POTASSIUM SERPL-MCNC: 4 MMOL/L — SIGNIFICANT CHANGE UP (ref 3.5–5)
POTASSIUM SERPL-SCNC: 4 MMOL/L — SIGNIFICANT CHANGE UP (ref 3.5–5)
RBC # BLD: 4.06 M/UL — LOW (ref 4.2–5.4)
RBC # FLD: 13.1 % — SIGNIFICANT CHANGE UP (ref 11.5–14.5)
SODIUM SERPL-SCNC: 142 MMOL/L — SIGNIFICANT CHANGE UP (ref 135–146)
WBC # BLD: 7.92 K/UL — SIGNIFICANT CHANGE UP (ref 4.8–10.8)
WBC # FLD AUTO: 7.92 K/UL — SIGNIFICANT CHANGE UP (ref 4.8–10.8)

## 2019-02-22 PROCEDURE — 99233 SBSQ HOSP IP/OBS HIGH 50: CPT

## 2019-02-22 RX ORDER — DOCUSATE SODIUM 100 MG
100 CAPSULE ORAL
Qty: 0 | Refills: 0 | Status: DISCONTINUED | OUTPATIENT
Start: 2019-02-22 | End: 2019-02-22

## 2019-02-22 RX ORDER — ACETAMINOPHEN 500 MG
2 TABLET ORAL
Qty: 0 | Refills: 0 | COMMUNITY
Start: 2019-02-22

## 2019-02-22 RX ORDER — LIDOCAINE 4 G/100G
1 CREAM TOPICAL
Qty: 0 | Refills: 0 | COMMUNITY
Start: 2019-02-22

## 2019-02-22 RX ORDER — SENNA PLUS 8.6 MG/1
1 TABLET ORAL AT BEDTIME
Qty: 0 | Refills: 0 | Status: DISCONTINUED | OUTPATIENT
Start: 2019-02-22 | End: 2019-02-22

## 2019-02-22 RX ADMIN — Medication 650 MILLIGRAM(S): at 05:37

## 2019-02-22 RX ADMIN — Medication 81 MILLIGRAM(S): at 11:51

## 2019-02-22 RX ADMIN — LOSARTAN POTASSIUM 100 MILLIGRAM(S): 100 TABLET, FILM COATED ORAL at 05:37

## 2019-02-22 RX ADMIN — LIDOCAINE 1 PATCH: 4 CREAM TOPICAL at 11:52

## 2019-02-22 RX ADMIN — PANTOPRAZOLE SODIUM 40 MILLIGRAM(S): 20 TABLET, DELAYED RELEASE ORAL at 05:37

## 2019-02-22 RX ADMIN — Medication 650 MILLIGRAM(S): at 11:51

## 2019-02-22 NOTE — PROGRESS NOTE ADULT - SUBJECTIVE AND OBJECTIVE BOX
Progress Note: General Surgery  Patient: LYLY ALEXANDER , 89y (04-Apr-1922)Female   MRN: [id number]  Location: [address]  Visit: 02-19-19 Inpatient  Date: 02-22-19 @ 08:35  Hospital Day: 4  Post-op Day:     Procedure/Diagnosis: s/p fall from standing, right 4th-6th rib fractures, L3 compression fracture  Events over 24h: Patient was hypertensive to 203/95 yesterday, was given 10 IV labetolol and repeat BP was 138/90.  This morning blood pressure was measured at 172/77.  Patient confused at baseline.  Otherwise, no acute overnight events.    Vitals: T(F): 96.2 (02-22-19 @ 05:37), Max: 98.1 (02-21-19 @ 16:46)  HR: 66 (02-22-19 @ 05:37)  BP: 172/77 (02-22-19 @ 05:37) (138/90 - 203/95)  RR: 17 (02-22-19 @ 05:37)  SpO2: 96% (02-21-19 @ 16:46)    Diet: Diet, DASH/TLC:   Sodium & Cholesterol Restricted (02-19-19 @ 19:31)    Physical Examination:  General Appearance: NAD, alert and cooperative  HEENT: NCAT, WNL  Heart: S1 and S2. No murmurs. Rhythm is regular   Lungs: Clear to auscultation BL, chest pain over right side of chest  Abdomen:  Positive bowel sounds. Soft, nondistended, nontender    Medications: [Standing]  acetaminophen   Tablet .. 650 milliGRAM(s) Oral every 6 hours  aspirin enteric coated 81 milliGRAM(s) Oral daily  chlorhexidine 4% Liquid 1 Application(s) Topical <User Schedule>  lidocaine   Patch 1 Patch Transdermal daily  losartan 100 milliGRAM(s) Oral daily  pantoprazole    Tablet 40 milliGRAM(s) Oral before breakfast    DVT Prophylaxis:   GI Prophylaxis: pantoprazole    Tablet 40 milliGRAM(s) Oral before breakfast    Antibiotics:   Anticoagulation:   Medications:[PRN]  metoclopramide 10 milliGRAM(s) Oral daily PRN  oxyCODONE    IR 5 milliGRAM(s) Oral every 6 hours PRN    Labs:                        12.3   7.92  )-----------( 296      ( 22 Feb 2019 01:15 )             37.2     02-22    142  |  103  |  41<H>  ----------------------------<  129<H>  4.0   |  20  |  2.0<H>    Ca    8.8      22 Feb 2019 01:15  Phos  5.2     02-22  Mg     2.5     02-22        PT/INR - ( 21 Feb 2019 00:50 )   PT: 13.10 sec;   INR: 1.14 ratio         PTT - ( 21 Feb 2019 00:50 )  PTT:33.2 sec    CARDIAC MARKERS ( 20 Feb 2019 16:53 )  x     / <0.01 ng/mL / 204 U/L / x     / 4.1 ng/mL  CARDIAC MARKERS ( 20 Feb 2019 09:30 )  x     / <0.01 ng/mL / 61 U/L / x     / 3.0 ng/mL      Urine/Micro:    Culture - Blood (collected 19 Feb 2019 13:47)  Source: .Blood Blood-Peripheral  Preliminary Report (21 Feb 2019 01:01):    No growth to date.    Culture - Blood (collected 19 Feb 2019 13:47)  Source: .Blood Blood-Peripheral  Preliminary Report (21 Feb 2019 01:01):    No growth to date.

## 2019-02-22 NOTE — DISCHARGE NOTE ADULT - MEDICATION SUMMARY - MEDICATIONS TO TAKE
I will START or STAY ON the medications listed below when I get home from the hospital:    Aspir 81 oral delayed release tablet  -- 1 tab(s) by mouth once a day  -- Indication: For Home med    acetaminophen 325 mg oral tablet  -- 2 tab(s) by mouth every 6 hours  -- Indication: For Prn pain    losartan 100 mg oral tablet  -- 1 tab(s) by mouth once a day  -- Indication: For Home med    Eliquis 2.5 mg oral tablet  -- 1 tab(s) by mouth 2 times a day  -- Indication: For Home med    Reglan 10 mg oral tablet  -- 1 tab(s) by mouth once a day, As Needed  -- Indication: For Home med    lidocaine 5% topical film  -- Apply on skin to affected area  -- Indication: For Prn pain

## 2019-02-22 NOTE — PROGRESS NOTE ADULT - ASSESSMENT
Assessment:  89y Female patient admitted s/p fall from standing, right 4th-6th rib fractures, L3 compression fracture    Plan:  -f/u medicine consult  -BP meds  -pain control  -home meds  - for dispo planning: SNF placement    Date/Time: 02-22-19 @ 08:35

## 2019-02-22 NOTE — DISCHARGE NOTE ADULT - CARE PLAN
Principal Discharge DX:	Multiple rib fractures  Goal:	Complete Recovery  Assessment and plan of treatment:	Continue incentive spirometer 10x/hr for at least 1 week  Follow up with PCP or Trauma Clinic for follow up.  tylenol/lidocaine patch for pain as needed.

## 2019-02-22 NOTE — DISCHARGE NOTE ADULT - PLAN OF CARE
Complete Recovery Continue incentive spirometer 10x/hr for at least 1 week  Follow up with PCP or Trauma Clinic for follow up.  tylenol/lidocaine patch for pain as needed.

## 2019-02-22 NOTE — DISCHARGE NOTE ADULT - PATIENT PORTAL LINK FT
You can access the [facility] Patient Portal, offered by , by registering with the following website: [email or web address]

## 2019-02-22 NOTE — DISCHARGE NOTE ADULT - HOSPITAL COURSE
89 year old female s/p fall from standing with right 4-6th rib fractures. The patient was observed in the ICU and downgraded without incident. The patient was doing well, seen by physiatry and recommended for SNF. There was no changes in her home meds. The patient will follow up with PCP or trauma clinic in 1 week.

## 2019-02-22 NOTE — DISCHARGE NOTE ADULT - CARE PROVIDER_API CALL
Ori Alvarado)  Surgery; Surgical Critical Care  [address] [place] [address], [address] [place] [address]  Phone: [phone]  Fax: [phone]  Follow Up Time:

## 2019-02-22 NOTE — DISCHARGE NOTE ADULT - THE PATIENT HAS
"Subjective:      Patient ID: Escobar Hair is a 26 y.o. female.    Chief Complaint: Nail Problem    She is presenting for right hallux toe problem. She had some kind of trauma about a year ago on the right hallux. She saw some blood underneath the nail plate and did some soaking. Since then right hallux nail has been deformed and discolored. It is thickened so it is painful when she is wearing closed shoe. She went to PCP and referred to me. Denies any other symptoms. No pain at this time.    Review of Systems   Constitution: Negative for decreased appetite, fever, weakness and malaise/fatigue.   HENT: Negative for congestion.    Cardiovascular: Negative for chest pain and leg swelling.   Respiratory: Negative for cough and shortness of breath.    Skin: Positive for color change and nail changes. Negative for rash.   Musculoskeletal: Negative for arthritis, joint pain, joint swelling and muscle weakness.   Gastrointestinal: Negative for bloating, abdominal pain, nausea and vomiting.   Neurological: Negative for headaches and numbness.   Psychiatric/Behavioral: Negative for altered mental status.     Vitals:    10/05/18 1545   BP: 105/72   Pulse: 83   Resp: 16   Weight: 94.3 kg (208 lb)   Height: 5' 7" (1.702 m)   PainSc: 0-No pain     No past medical history on file.    Past Surgical History:   Procedure Laterality Date    FRACTURE SURGERY      WRIST FRACTURE SURGERY Right        Family History   Problem Relation Age of Onset    Breast cancer Neg Hx     Colon cancer Neg Hx     Ovarian cancer Neg Hx        Social History     Socioeconomic History    Marital status: Single     Spouse name: None    Number of children: None    Years of education: None    Highest education level: None   Social Needs    Financial resource strain: None    Food insecurity - worry: None    Food insecurity - inability: None    Transportation needs - medical: None    Transportation needs - non-medical: None "   Occupational History    None   Tobacco Use    Smoking status: Never Smoker    Smokeless tobacco: Never Used   Substance and Sexual Activity    Alcohol use: Yes     Comment: socially    Drug use: No    Sexual activity: Yes     Partners: Male     Birth control/protection: None     Comment: single   Other Topics Concern    None   Social History Narrative    None       Current Outpatient Medications   Medication Sig Dispense Refill    norethindrone-ethinyl estradiol-iron (MICROGESTIN FE 1.5/30, 28,) 1.5 mg-30 mcg (21)/75 mg (7) tablet Take 1 tablet by mouth once daily. 28 tablet 11    phentermine (ADIPEX-P) 37.5 mg tablet Take 37.5 mg by mouth before breakfast.      ibuprofen (ADVIL,MOTRIN) 800 MG tablet Take 1 tablet (800 mg total) by mouth 3 (three) times daily. 30 tablet 1     No current facility-administered medications for this visit.        Review of patient's allergies indicates:  No Known Allergies        Objective:      Physical Exam   Constitutional: She appears well-developed and well-nourished. No distress.   Musculoskeletal: Normal range of motion. She exhibits tenderness (right hallux) and deformity (right hallux deformity).   Skin: Skin is warm and dry. Capillary refill takes 2 to 3 seconds.   Psychiatric: She has a normal mood and affect.     Vascular: Distal DP/PT pulses palpable 2/4 b/l. CRT < 3 sec to tips of toes. No edema noted to b/l LE. No vericosities noted to b/l LEs. Hair growth present b/l LE, warm to touch b/l LE    Dermatologic: No open lesions, lacerations or wounds. Interdigital spaces clean, dry and intact b/l. No erythema, rubor, calor noted to b/l LE  R hallux: discolored and deformed hallux nail. Mycotic elongated with subungual debris    Musculoskeletal: Equinus noted b/l ankles with < 10 deg DF noted b/l. MMT 5/5 in DF/PF/Inv/Ev resistance with no reproduction of pain in any direction. Passive range of motion of ankle and pedal joints is painless b/l. No calf tenderness  b/l LE, Compartments soft/compressible b/l.     Neurological: Light touch, proprioception, and sharp/dull sensation are all intact b/l. Protective threshold with the Orrick-Wienstein monofilament is intact b/l. Vibratory sensation intact b/l.           Assessment:       Encounter Diagnosis   Name Primary?    Disease of nail - Right Foot Yes         Plan:       Escobar was seen today for nail problem.    Diagnoses and all orders for this visit:    Disease of nail - Right Foot    Other orders  -     ibuprofen (ADVIL,MOTRIN) 800 MG tablet; Take 1 tablet (800 mg total) by mouth 3 (three) times daily.      I counseled the patient on her conditions, their implications and medical management.    -27 y/o female with right hallux deformed nail    -s/p total nail avulsion without phenol. See procedure note. It is important to note that there was a small laceration on the nail bed medially. It is covered by the surrounding soft tissue. Pt is aware. There was a new nail growing underneath but it appeared to be deformed as well.   -dressing instruction given to patient  -f/u 1 week  -Rx. Motrin 800mg TID as needed pain   -The nature of the condition, options for management, as well as potential risks and complications were discussed in detail with patient. Patient was amenable to my recommendations and left my office fully informed and will follow up as instructed or sooner if necessary.    -Patient was advised of signs and symptoms of infection including redness, drainage, purulence, odor, streaking, fever, chills and I advised patient to seek medical attention (ER or urgent care) if these symptoms arise.       -POST NAIL PROCEDURE INSTRUCTIONS    1. Leave bandage intact for 6-12  hours. If the bandage sticks as you try to remove it, soak it in warm water until it lifts off.    2. Soak toe daily in warm water and Epsom salts for 5 - 8 minutes daily.     3. Dry foot completely after soaking, and apply a small amount of  "triple antibiotic ointment (neosporin works fine!) and a fabric or cloth bandaid ("plastic" bandaids tend to lift off with ointment use).  Wear open-toed shoes as needed for comfort.     4. Take Advil or Tylenol as needed for pain.     5. Your toe may drain for the next few days. Normal drainage is yellow-to-pink, and clear, much like the fluid in a blister. Watch for redness spreading up your toe into your foot, white thick drainage (pus), pain unrelieved by medication, or nausea/vomiting/fever/chills. These are signs of infection. Please call the clinic or visit your doctor.    6. You may stop soaking and dressing toe once it stops draining (about 3 - 7 days).    7. Patient was advised to look for signs and symptoms of infection including redness, drainage, purulence, odor, streaking, fever, chills and I advised patient to seek medical attention (ER or urgent care) if these symptoms arise.              " no difficulties

## 2019-02-23 ENCOUNTER — OUTPATIENT (OUTPATIENT)
Dept: OUTPATIENT SERVICES | Facility: HOSPITAL | Age: 84
LOS: 1 days | Discharge: HOME | End: 2019-02-23

## 2019-02-23 DIAGNOSIS — I82.91 CHRONIC EMBOLISM AND THROMBOSIS OF UNSPECIFIED VEIN: ICD-10-CM

## 2019-02-23 DIAGNOSIS — J18.9 PNEUMONIA, UNSPECIFIED ORGANISM: ICD-10-CM

## 2019-02-23 PROBLEM — E78.5 HYPERLIPIDEMIA, UNSPECIFIED: Chronic | Status: ACTIVE | Noted: 2019-02-19

## 2019-02-23 PROBLEM — I82.409 ACUTE EMBOLISM AND THROMBOSIS OF UNSPECIFIED DEEP VEINS OF UNSPECIFIED LOWER EXTREMITY: Chronic | Status: ACTIVE | Noted: 2019-02-19

## 2019-02-23 LAB
-  CANDIDA ALBICANS: SIGNIFICANT CHANGE UP
-  CANDIDA GLABRATA: SIGNIFICANT CHANGE UP
-  CANDIDA KRUSEI: SIGNIFICANT CHANGE UP
-  CANDIDA PARAPSILOSIS: SIGNIFICANT CHANGE UP
-  CANDIDA TROPICALIS: SIGNIFICANT CHANGE UP
-  COAGULASE NEGATIVE STAPHYLOCOCCUS: SIGNIFICANT CHANGE UP
-  K. PNEUMONIAE GROUP: SIGNIFICANT CHANGE UP
-  KPC RESISTANCE GENE: SIGNIFICANT CHANGE UP
-  STREPTOCOCCUS SP. (NOT GRP A, B OR S PNEUMONIAE): SIGNIFICANT CHANGE UP
A BAUMANNII DNA SPEC QL NAA+PROBE: SIGNIFICANT CHANGE UP
E CLOAC COMP DNA BLD POS QL NAA+PROBE: SIGNIFICANT CHANGE UP
E COLI DNA BLD POS QL NAA+NON-PROBE: SIGNIFICANT CHANGE UP
ENTEROCOC DNA BLD POS QL NAA+NON-PROBE: SIGNIFICANT CHANGE UP
ENTEROCOC DNA BLD POS QL NAA+NON-PROBE: SIGNIFICANT CHANGE UP
GP B STREP DNA BLD POS QL NAA+NON-PROBE: SIGNIFICANT CHANGE UP
GRAM STN FLD: SIGNIFICANT CHANGE UP
HAEM INFLU DNA BLD POS QL NAA+NON-PROBE: SIGNIFICANT CHANGE UP
K OXYTOCA DNA BLD POS QL NAA+NON-PROBE: SIGNIFICANT CHANGE UP
L MONOCYTOG DNA BLD POS QL NAA+NON-PROBE: SIGNIFICANT CHANGE UP
METHOD TYPE: SIGNIFICANT CHANGE UP
MRSA SPEC QL CULT: SIGNIFICANT CHANGE UP
MSSA DNA SPEC QL NAA+PROBE: SIGNIFICANT CHANGE UP
N MEN ISLT CULT: SIGNIFICANT CHANGE UP
P AERUGINOSA DNA BLD POS NAA+NON-PROBE: SIGNIFICANT CHANGE UP
S MARCESCENS DNA BLD POS NAA+NON-PROBE: SIGNIFICANT CHANGE UP
S PNEUM DNA BLD POS QL NAA+NON-PROBE: SIGNIFICANT CHANGE UP
S PYO DNA BLD POS QL NAA+NON-PROBE: SIGNIFICANT CHANGE UP

## 2019-02-25 LAB
CULTURE RESULTS: SIGNIFICANT CHANGE UP
SPECIMEN SOURCE: SIGNIFICANT CHANGE UP

## 2019-02-27 DIAGNOSIS — S32.039A UNSPECIFIED FRACTURE OF THIRD LUMBAR VERTEBRA, INITIAL ENCOUNTER FOR CLOSED FRACTURE: ICD-10-CM

## 2019-02-27 DIAGNOSIS — N17.9 ACUTE KIDNEY FAILURE, UNSPECIFIED: ICD-10-CM
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
## 2019-02-27 DIAGNOSIS — I10 ESSENTIAL (PRIMARY) HYPERTENSION: ICD-10-CM

## 2019-02-27 DIAGNOSIS — S00.81XA ABRASION OF OTHER PART OF HEAD, INITIAL ENCOUNTER: ICD-10-CM

## 2019-02-27 DIAGNOSIS — I69.351 HEMIPLEGIA AND HEMIPARESIS FOLLOWING CEREBRAL INFARCTION AFFECTING RIGHT DOMINANT SIDE: ICD-10-CM

## 2019-02-27 DIAGNOSIS — E78.5 HYPERLIPIDEMIA, UNSPECIFIED: ICD-10-CM

## 2019-02-27 DIAGNOSIS — S22.42XA MULTIPLE FRACTURES OF RIBS, LEFT SIDE, INITIAL ENCOUNTER FOR CLOSED FRACTURE: ICD-10-CM

## 2019-02-27 DIAGNOSIS — Z79.82 LONG TERM (CURRENT) USE OF ASPIRIN: ICD-10-CM

## 2019-02-27 DIAGNOSIS — Z79.01 LONG TERM (CURRENT) USE OF ANTICOAGULANTS: ICD-10-CM
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
## 2019-02-27 DIAGNOSIS — J18.9 PNEUMONIA, UNSPECIFIED ORGANISM: ICD-10-CM

## 2019-02-27 DIAGNOSIS — Z86.718 PERSONAL HISTORY OF OTHER VENOUS THROMBOSIS AND EMBOLISM: ICD-10-CM

## 2019-02-27 DIAGNOSIS — F05 DELIRIUM DUE TO KNOWN PHYSIOLOGICAL CONDITION: ICD-10-CM

## 2019-03-01 ENCOUNTER — OUTPATIENT (OUTPATIENT)
Dept: OUTPATIENT SERVICES | Facility: HOSPITAL | Age: 84
LOS: 1 days | Discharge: HOME | End: 2019-03-01

## 2019-03-01 DIAGNOSIS — R50.9 FEVER, UNSPECIFIED: ICD-10-CM

## 2019-03-02 ENCOUNTER — OUTPATIENT (OUTPATIENT)
Dept: OUTPATIENT SERVICES | Facility: HOSPITAL | Age: 84
LOS: 1 days | Discharge: HOME | End: 2019-03-02

## 2019-03-02 DIAGNOSIS — D72.819 DECREASED WHITE BLOOD CELL COUNT, UNSPECIFIED: ICD-10-CM

## 2019-03-03 ENCOUNTER — OUTPATIENT (OUTPATIENT)
Dept: OUTPATIENT SERVICES | Facility: HOSPITAL | Age: 84
LOS: 1 days | Discharge: HOME | End: 2019-03-03

## 2019-03-03 DIAGNOSIS — R79.9 ABNORMAL FINDING OF BLOOD CHEMISTRY, UNSPECIFIED: ICD-10-CM

## 2019-03-04 ENCOUNTER — OUTPATIENT (OUTPATIENT)
Dept: OUTPATIENT SERVICES | Facility: HOSPITAL | Age: 84
LOS: 1 days | Discharge: HOME | End: 2019-03-04

## 2019-03-04 DIAGNOSIS — R79.9 ABNORMAL FINDING OF BLOOD CHEMISTRY, UNSPECIFIED: ICD-10-CM

## 2019-03-08 ENCOUNTER — CHART COPY (OUTPATIENT)
Age: 84
End: 2019-03-08

## 2019-04-17 ENCOUNTER — OUTPATIENT (OUTPATIENT)
Dept: OUTPATIENT SERVICES | Facility: HOSPITAL | Age: 84
LOS: 1 days | Discharge: HOME | End: 2019-04-17

## 2019-04-17 DIAGNOSIS — R79.9 ABNORMAL FINDING OF BLOOD CHEMISTRY, UNSPECIFIED: ICD-10-CM

## 2019-05-29 LAB
CULTURE RESULTS: SIGNIFICANT CHANGE UP
ORGANISM # SPEC MICROSCOPIC CNT: SIGNIFICANT CHANGE UP
ORGANISM # SPEC MICROSCOPIC CNT: SIGNIFICANT CHANGE UP
SPECIMEN SOURCE: SIGNIFICANT CHANGE UP

## 2019-09-08 ENCOUNTER — INPATIENT (INPATIENT)
Facility: HOSPITAL | Age: 84
LOS: 2 days | Discharge: NURSING, REHAB OR LONG-TERM CARE | End: 2019-09-11
Attending: INTERNAL MEDICINE | Admitting: INTERNAL MEDICINE
Payer: MEDICARE

## 2019-09-08 VITALS
OXYGEN SATURATION: 98 % | TEMPERATURE: 98 F | SYSTOLIC BLOOD PRESSURE: 188 MMHG | DIASTOLIC BLOOD PRESSURE: 99 MMHG | RESPIRATION RATE: 18 BRPM | HEART RATE: 76 BPM

## 2019-09-08 LAB
ALBUMIN SERPL ELPH-MCNC: 3.2 G/DL — LOW (ref 3.5–5.2)
ALP SERPL-CCNC: 122 U/L — HIGH (ref 30–115)
ALT FLD-CCNC: 16 U/L — SIGNIFICANT CHANGE UP (ref 0–41)
ANION GAP SERPL CALC-SCNC: 11 MMOL/L — SIGNIFICANT CHANGE UP (ref 7–14)
ANION GAP SERPL CALC-SCNC: 15 MMOL/L — HIGH (ref 7–14)
AST SERPL-CCNC: 30 U/L — SIGNIFICANT CHANGE UP (ref 0–41)
BASOPHILS # BLD AUTO: 0.07 K/UL — SIGNIFICANT CHANGE UP (ref 0–0.2)
BASOPHILS NFR BLD AUTO: 0.4 % — SIGNIFICANT CHANGE UP (ref 0–1)
BILIRUB SERPL-MCNC: 0.8 MG/DL — SIGNIFICANT CHANGE UP (ref 0.2–1.2)
BUN SERPL-MCNC: 22 MG/DL — HIGH (ref 10–20)
BUN SERPL-MCNC: 24 MG/DL — HIGH (ref 10–20)
CALCIUM SERPL-MCNC: 8.5 MG/DL — SIGNIFICANT CHANGE UP (ref 8.5–10.1)
CALCIUM SERPL-MCNC: 9 MG/DL — SIGNIFICANT CHANGE UP (ref 8.5–10.1)
CHLORIDE SERPL-SCNC: 98 MMOL/L — SIGNIFICANT CHANGE UP (ref 98–110)
CHLORIDE SERPL-SCNC: 99 MMOL/L — SIGNIFICANT CHANGE UP (ref 98–110)
CO2 SERPL-SCNC: 23 MMOL/L — SIGNIFICANT CHANGE UP (ref 17–32)
CO2 SERPL-SCNC: 28 MMOL/L — SIGNIFICANT CHANGE UP (ref 17–32)
CREAT SERPL-MCNC: 0.9 MG/DL — SIGNIFICANT CHANGE UP (ref 0.7–1.5)
CREAT SERPL-MCNC: 1.1 MG/DL — SIGNIFICANT CHANGE UP (ref 0.7–1.5)
EOSINOPHIL # BLD AUTO: 0.01 K/UL — SIGNIFICANT CHANGE UP (ref 0–0.7)
EOSINOPHIL NFR BLD AUTO: 0.1 % — SIGNIFICANT CHANGE UP (ref 0–8)
GLUCOSE SERPL-MCNC: 106 MG/DL — HIGH (ref 70–99)
GLUCOSE SERPL-MCNC: 107 MG/DL — HIGH (ref 70–99)
HCT VFR BLD CALC: 36.5 % — LOW (ref 37–47)
HGB BLD-MCNC: 12.1 G/DL — SIGNIFICANT CHANGE UP (ref 12–16)
IMM GRANULOCYTES NFR BLD AUTO: 0.5 % — HIGH (ref 0.1–0.3)
LACTATE SERPL-SCNC: 1.5 MMOL/L — SIGNIFICANT CHANGE UP (ref 0.5–2.2)
LIDOCAIN IGE QN: 33 U/L — SIGNIFICANT CHANGE UP (ref 7–60)
LYMPHOCYTES # BLD AUTO: 12.3 % — LOW (ref 20.5–51.1)
LYMPHOCYTES # BLD AUTO: 2.06 K/UL — SIGNIFICANT CHANGE UP (ref 1.2–3.4)
MCHC RBC-ENTMCNC: 30.1 PG — SIGNIFICANT CHANGE UP (ref 27–31)
MCHC RBC-ENTMCNC: 33.2 G/DL — SIGNIFICANT CHANGE UP (ref 32–37)
MCV RBC AUTO: 90.8 FL — SIGNIFICANT CHANGE UP (ref 81–99)
MONOCYTES # BLD AUTO: 1.42 K/UL — HIGH (ref 0.1–0.6)
MONOCYTES NFR BLD AUTO: 8.5 % — SIGNIFICANT CHANGE UP (ref 1.7–9.3)
NEUTROPHILS # BLD AUTO: 13.11 K/UL — HIGH (ref 1.4–6.5)
NEUTROPHILS NFR BLD AUTO: 78.2 % — HIGH (ref 42.2–75.2)
NRBC # BLD: 0 /100 WBCS — SIGNIFICANT CHANGE UP (ref 0–0)
PLATELET # BLD AUTO: 237 K/UL — SIGNIFICANT CHANGE UP (ref 130–400)
POTASSIUM SERPL-MCNC: 4.3 MMOL/L — SIGNIFICANT CHANGE UP (ref 3.5–5)
POTASSIUM SERPL-MCNC: 4.5 MMOL/L — SIGNIFICANT CHANGE UP (ref 3.5–5)
POTASSIUM SERPL-SCNC: 4.3 MMOL/L — SIGNIFICANT CHANGE UP (ref 3.5–5)
POTASSIUM SERPL-SCNC: 4.5 MMOL/L — SIGNIFICANT CHANGE UP (ref 3.5–5)
PROT SERPL-MCNC: 6.4 G/DL — SIGNIFICANT CHANGE UP (ref 6–8)
RBC # BLD: 4.02 M/UL — LOW (ref 4.2–5.4)
RBC # FLD: 14.3 % — SIGNIFICANT CHANGE UP (ref 11.5–14.5)
SODIUM SERPL-SCNC: 137 MMOL/L — SIGNIFICANT CHANGE UP (ref 135–146)
SODIUM SERPL-SCNC: 137 MMOL/L — SIGNIFICANT CHANGE UP (ref 135–146)
WBC # BLD: 16.75 K/UL — HIGH (ref 4.8–10.8)
WBC # FLD AUTO: 16.75 K/UL — HIGH (ref 4.8–10.8)

## 2019-09-08 PROCEDURE — 99285 EMERGENCY DEPT VISIT HI MDM: CPT | Mod: GC

## 2019-09-08 PROCEDURE — 71045 X-RAY EXAM CHEST 1 VIEW: CPT | Mod: 26

## 2019-09-08 PROCEDURE — 93010 ELECTROCARDIOGRAM REPORT: CPT

## 2019-09-08 RX ORDER — CHLORHEXIDINE GLUCONATE 213 G/1000ML
1 SOLUTION TOPICAL
Refills: 0 | Status: DISCONTINUED | OUTPATIENT
Start: 2019-09-08 | End: 2019-09-11

## 2019-09-08 RX ORDER — CEFTRIAXONE 500 MG/1
1000 INJECTION, POWDER, FOR SOLUTION INTRAMUSCULAR; INTRAVENOUS EVERY 24 HOURS
Refills: 0 | Status: DISCONTINUED | OUTPATIENT
Start: 2019-09-08 | End: 2019-09-11

## 2019-09-08 RX ORDER — ASPIRIN/CALCIUM CARB/MAGNESIUM 324 MG
81 TABLET ORAL DAILY
Refills: 0 | Status: DISCONTINUED | OUTPATIENT
Start: 2019-09-08 | End: 2019-09-09

## 2019-09-08 RX ORDER — AZITHROMYCIN 500 MG/1
500 TABLET, FILM COATED ORAL DAILY
Refills: 0 | Status: DISCONTINUED | OUTPATIENT
Start: 2019-09-08 | End: 2019-09-11

## 2019-09-08 RX ORDER — APIXABAN 2.5 MG/1
2.5 TABLET, FILM COATED ORAL
Refills: 0 | Status: DISCONTINUED | OUTPATIENT
Start: 2019-09-08 | End: 2019-09-11

## 2019-09-08 RX ORDER — ACETAMINOPHEN 500 MG
650 TABLET ORAL EVERY 6 HOURS
Refills: 0 | Status: DISCONTINUED | OUTPATIENT
Start: 2019-09-08 | End: 2019-09-11

## 2019-09-08 RX ORDER — SODIUM CHLORIDE 9 MG/ML
1000 INJECTION, SOLUTION INTRAVENOUS ONCE
Refills: 0 | Status: COMPLETED | OUTPATIENT
Start: 2019-09-08 | End: 2019-09-08

## 2019-09-08 RX ORDER — CEFTRIAXONE 500 MG/1
1000 INJECTION, POWDER, FOR SOLUTION INTRAMUSCULAR; INTRAVENOUS ONCE
Refills: 0 | Status: COMPLETED | OUTPATIENT
Start: 2019-09-08 | End: 2019-09-08

## 2019-09-08 RX ORDER — LIDOCAINE 4 G/100G
1 CREAM TOPICAL DAILY
Refills: 0 | Status: DISCONTINUED | OUTPATIENT
Start: 2019-09-08 | End: 2019-09-11

## 2019-09-08 RX ORDER — METOCLOPRAMIDE HCL 10 MG
10 TABLET ORAL DAILY
Refills: 0 | Status: DISCONTINUED | OUTPATIENT
Start: 2019-09-08 | End: 2019-09-11

## 2019-09-08 RX ORDER — AZITHROMYCIN 500 MG/1
500 TABLET, FILM COATED ORAL ONCE
Refills: 0 | Status: COMPLETED | OUTPATIENT
Start: 2019-09-08 | End: 2019-09-08

## 2019-09-08 RX ORDER — ENOXAPARIN SODIUM 100 MG/ML
40 INJECTION SUBCUTANEOUS DAILY
Refills: 0 | Status: DISCONTINUED | OUTPATIENT
Start: 2019-09-08 | End: 2019-09-09

## 2019-09-08 RX ORDER — HYDRALAZINE HCL 50 MG
10 TABLET ORAL ONCE
Refills: 0 | Status: COMPLETED | OUTPATIENT
Start: 2019-09-08 | End: 2019-09-08

## 2019-09-08 RX ORDER — LOSARTAN POTASSIUM 100 MG/1
100 TABLET, FILM COATED ORAL DAILY
Refills: 0 | Status: DISCONTINUED | OUTPATIENT
Start: 2019-09-08 | End: 2019-09-11

## 2019-09-08 RX ADMIN — LOSARTAN POTASSIUM 100 MILLIGRAM(S): 100 TABLET, FILM COATED ORAL at 16:09

## 2019-09-08 RX ADMIN — CEFTRIAXONE 100 MILLIGRAM(S): 500 INJECTION, POWDER, FOR SOLUTION INTRAMUSCULAR; INTRAVENOUS at 12:09

## 2019-09-08 RX ADMIN — Medication 10 MILLIGRAM(S): at 22:44

## 2019-09-08 RX ADMIN — SODIUM CHLORIDE 1000 MILLILITER(S): 9 INJECTION, SOLUTION INTRAVENOUS at 11:08

## 2019-09-08 RX ADMIN — APIXABAN 2.5 MILLIGRAM(S): 2.5 TABLET, FILM COATED ORAL at 16:10

## 2019-09-08 RX ADMIN — AZITHROMYCIN 255 MILLIGRAM(S): 500 TABLET, FILM COATED ORAL at 12:09

## 2019-09-08 NOTE — H&P ADULT - ASSESSMENT
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      # Cough and sob secondary to Community acquired pneumonia   - cxr shows right upper lobe opacity, follow up final read  - wbc of [id number]  - no temps on admission, temp of 104 at home, bp elevated  - given 1 x ceftriaxone in ER, 1 x azithro, will continue treatment  - monitor vitals    # HTN, elevated  - will restart home meds and evaluate  - losartan 100mg daily    # ho of DVT s/p hip surgery  - on elliquis 2.5 bid, will continue    # Difficulty ambulating for one week, uses a walker at home, with residual deficits from prior cva  - pt/rehab ordered    Activity: as tolerated  diet: dash  dvt ppx: lovenox 40mg daily  gi ppx: not indicated  full code  dispo: from home, lives with daughter has been to snf in past. 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      # Community acquired pneumonia   - cxr shows right upper lobe opacity, follow up final read  - wbc of [id number]  - no temps on admission, temp of 104 at home, bp elevated  - given 1 x ceftriaxone in ER, 1 x azithro, will continue treatment  - monitor vitals    # HTN, elevated  - will restart home meds and evaluate  - losartan 100mg daily    # ho of DVT s/p hip surgery  - on elliquis 2.5 bid, will continue    # Difficulty ambulating for one week, uses a walker at home, with residual deficits from prior cva  - pt/rehab ordered    Activity: as tolerated  diet: dash  dvt ppx: lovenox 40mg daily  gi ppx: not indicated  full code  dispo: from home, lives with daughter has been to snf in past. 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      # Community acquired pneumonia; no sepsis on admission  cxr shows faint right upper lobe haziness c/w early infiltrate, minor  c/w rocephin/azithro - will change to oral soon  not on O2  cbc jose    # HTN, elevated  losartan 100mg daily  might add norvasc if needed, newer BPs are better    # ho of DVT s/p hip surgery  on elliquis 2.5 bid, will continue    # hx CVA  since on a/c, d/c asa    # Difficulty ambulating for one week, uses a walker at home, with residual deficits from prior cva - pt/rehab ordered  dtr prefers pt go to SNF for STR    # baseline hx of dementia, unknown type - no behavioral issues  observe  not on meds    # Activity: as tolerated w/ asst    # dvt ppx: eliquis    # gi ppx: PPI po    # full code - will d/w dtr in person    dispo: Tx PNA; rehab eval for STR at SNF; limit labs

## 2019-09-08 NOTE — ED PROVIDER NOTE - OBJECTIVE STATEMENT
89 y/o female with pmhx of CVA with R sided deficits, frequent falls, DVT on eliquis presents with cough and fever. Per daughter, patient has been having productive cough and progressive weakness all week, associated with oral temperature of 103F yesterday. Admits to giving tylenol last night. Denies chest pain, sob, supplemental oxygen use, n/v/d, abdominal pain, LE swelling, recent falls.

## 2019-09-08 NOTE — ED PROVIDER NOTE - NS ED ROS FT
Constitutional: See HPI.  Eyes: No visual changes, eye pain or discharge.  ENMT: No hearing changes, pain, discharge or infections. No neck pain or stiffness.  Cardiac: No chest pain, SOB or edema. No chest pain with exertion.  Respiratory: + cough; no respiratory distress.   GI: No nausea, vomiting, diarrhea or abdominal pain.  : No dysuria, frequency or burning.  MS: +generalized weakness; No myalgia, joint pain or back pain.  Neuro: No headache or weakness. No LOC.  Skin: No skin rash.  Endo: No hx of DM, thyroid disease  Except as documented in HPI, all other review of systems is negative

## 2019-09-08 NOTE — ED ADULT NURSE NOTE - OBJECTIVE STATEMENT
89 y/o female brought in for shortness of breath. Patient brought in with daughter and health aid; as per daughter patient has shortness of breath for 1 week with increased green secretions and cough. Patient developed fever tmax 103 last Tylenol last night. Patient denies chest pain, dizziness, headache, nausea, vomiting, diarrhea, abdominal pain. As per ems patient was tachypneic placed on oxygen saturation 94% on room air.

## 2019-09-08 NOTE — H&P ADULT - NSICDXPASTMEDICALHX_GEN_ALL_CORE_FT
PAST MEDICAL HISTORY:  CVA (cerebral vascular accident)     DVT (deep venous thrombosis)     Hyperlipemia

## 2019-09-08 NOTE — ED PROVIDER NOTE - PHYSICAL EXAMINATION
CONSTITUTIONAL: well appearing for age, fatigued, Omani speaking, responds to questions appropriately   SKIN: warm, dry  HEAD: Normocephalic; atraumatic.  EYES: no conj injection  ENT: No nasal discharge; airway clear.  NECK: Supple; non tender.  CARD: S1, S2 normal; no murmurs, gallops, or rubs. Regular rate and rhythm.   RESP: +crackles right lower lobe; No wheezes, rales or rhonchi.  ABD: soft ntnd  EXT: No clubbing, cyanosis or edema.   NEURO: Alert, oriented, grossly unremarkable  PSYCH: Cooperative, appropriate.

## 2019-09-08 NOTE — ED ADULT TRIAGE NOTE - CHIEF COMPLAINT QUOTE
as per ems has been sob and had a productive cough since yesterday. as per daughter shes having productive greenish yellow sputum. had a tmax 103 yesterday

## 2019-09-08 NOTE — ED PROVIDER NOTE - ATTENDING CONTRIBUTION TO CARE
89y f h/o  cva w/resid R sided weakness, on asa, hepC, dvt on elliquis, htn, hl, op p/w SOB & cough x 2d. Accomp by fever, Tm 104 @ home. H/o PNA/UTIs per daughter @ bedside. Daughter unsure of any abx within last 2 mos. No sick contacts or recent travel. No cp, nvd, abd pain, urinary sx, rash. PMD [place]. PE: frail elderly f nad, ncat, neck supple, rrr nl s1s2 no mrg, coarse BS bl no wheezes/rales, abd soft ntnd no palpable masses no rgr, no cvat, ext no cce dpi.

## 2019-09-08 NOTE — H&P ADULT - HISTORY OF PRESENT ILLNESS
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  Patient is Trinidadian speaking and daughter is available for questioning.  As per daughter the mother had coughing and lethargy with sob and inabiltiy to ambulate for about one week.  As per the daughter the patient cough was productive of yellowish green sputum, associated with fevers and chills.  The daughter also explains that the mother received only tylenol for the symptoms and was given the medication for a temp of 104 recorded at home.  The inabilty ambulating has been an issue in the past requiring snf admission however this recent issue as per the daughter is likely due to her weakness from the symptoms described.  No complaints of chest pain, no abd pain, no headache, no nausea, no vomiting, no dysuria, no increase in frequency. No sick contacts, or recent travel noted. 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  Patient is [place] speaking and daughter is available for questioning.  As per daughter the mother had coughing and lethargy with sob and inabiltiy to ambulate for about one week.  As per the daughter the patient cough was productive of yellowish green sputum, associated with fevers and chills.  The daughter also explains that the mother received only tylenol for the symptoms and was given the medication for a temp of 104 recorded at home.  The inabilty ambulating has been an issue in the past requiring snf admission however this recent issue as per the daughter is likely due to her weakness from the symptoms described.  No complaints of chest pain, no abd pain, no headache, no nausea, no vomiting, no dysuria, no increase in frequency. No sick contacts, or recent travel noted.      Attd: Joy Hutchinson RN; dtr says pt has dementia at baseline and gets a little out of sorts when in new places; pt seems fine now and is requesting to go home; pt on c/o cough and is aware she has PNA. Pt had fever, green sputum and chills at home. Dtr says pt is having difficulty walking.

## 2019-09-08 NOTE — ED ADULT NURSE NOTE - NSIMPLEMENTINTERV_GEN_ALL_ED
Implemented All Fall with Harm Risk Interventions:  [place] to call system. Call bell, personal items and telephone within reach. Instruct patient to call for assistance. Room bathroom lighting operational. Non-slip footwear when patient is off stretcher. Physically safe environment: no spills, clutter or unnecessary equipment. Stretcher in lowest position, wheels locked, appropriate side rails in place. Provide visual cue, wrist band, yellow gown, etc. Monitor gait and stability. Monitor for mental status changes and reorient to person, place, and time. Review medications for side effects contributing to fall risk. Reinforce activity limits and safety measures with patient and family. Provide visual clues: red socks.

## 2019-09-09 LAB
ANION GAP SERPL CALC-SCNC: 12 MMOL/L — SIGNIFICANT CHANGE UP (ref 7–14)
APPEARANCE UR: ABNORMAL
BACTERIA # UR AUTO: NEGATIVE — SIGNIFICANT CHANGE UP
BASOPHILS # BLD AUTO: 0.03 K/UL — SIGNIFICANT CHANGE UP (ref 0–0.2)
BASOPHILS NFR BLD AUTO: 0.2 % — SIGNIFICANT CHANGE UP (ref 0–1)
BILIRUB UR-MCNC: NEGATIVE — SIGNIFICANT CHANGE UP
BUN SERPL-MCNC: 24 MG/DL — HIGH (ref 10–20)
CALCIUM SERPL-MCNC: 9 MG/DL — SIGNIFICANT CHANGE UP (ref 8.5–10.1)
CHLORIDE SERPL-SCNC: 99 MMOL/L — SIGNIFICANT CHANGE UP (ref 98–110)
CO2 SERPL-SCNC: 25 MMOL/L — SIGNIFICANT CHANGE UP (ref 17–32)
COLOR SPEC: YELLOW — SIGNIFICANT CHANGE UP
CREAT SERPL-MCNC: 0.9 MG/DL — SIGNIFICANT CHANGE UP (ref 0.7–1.5)
DIFF PNL FLD: ABNORMAL
EOSINOPHIL # BLD AUTO: 0.06 K/UL — SIGNIFICANT CHANGE UP (ref 0–0.7)
EOSINOPHIL NFR BLD AUTO: 0.5 % — SIGNIFICANT CHANGE UP (ref 0–8)
EPI CELLS # UR: 4 /HPF — SIGNIFICANT CHANGE UP (ref 0–5)
GLUCOSE SERPL-MCNC: 109 MG/DL — HIGH (ref 70–99)
GLUCOSE UR QL: NEGATIVE — SIGNIFICANT CHANGE UP
HCT VFR BLD CALC: 34 % — LOW (ref 37–47)
HGB BLD-MCNC: 11.3 G/DL — LOW (ref 12–16)
HYALINE CASTS # UR AUTO: 9 /LPF — HIGH (ref 0–7)
IMM GRANULOCYTES NFR BLD AUTO: 0.8 % — HIGH (ref 0.1–0.3)
KETONES UR-MCNC: NEGATIVE — SIGNIFICANT CHANGE UP
LEUKOCYTE ESTERASE UR-ACNC: ABNORMAL
LYMPHOCYTES # BLD AUTO: 1.98 K/UL — SIGNIFICANT CHANGE UP (ref 1.2–3.4)
LYMPHOCYTES # BLD AUTO: 15.3 % — LOW (ref 20.5–51.1)
MAGNESIUM SERPL-MCNC: 2.3 MG/DL — SIGNIFICANT CHANGE UP (ref 1.8–2.4)
MCHC RBC-ENTMCNC: 29.9 PG — SIGNIFICANT CHANGE UP (ref 27–31)
MCHC RBC-ENTMCNC: 33.2 G/DL — SIGNIFICANT CHANGE UP (ref 32–37)
MCV RBC AUTO: 89.9 FL — SIGNIFICANT CHANGE UP (ref 81–99)
MONOCYTES # BLD AUTO: 1.18 K/UL — HIGH (ref 0.1–0.6)
MONOCYTES NFR BLD AUTO: 9.1 % — SIGNIFICANT CHANGE UP (ref 1.7–9.3)
NEUTROPHILS # BLD AUTO: 9.61 K/UL — HIGH (ref 1.4–6.5)
NEUTROPHILS NFR BLD AUTO: 74.1 % — SIGNIFICANT CHANGE UP (ref 42.2–75.2)
NITRITE UR-MCNC: NEGATIVE — SIGNIFICANT CHANGE UP
NRBC # BLD: 0 /100 WBCS — SIGNIFICANT CHANGE UP (ref 0–0)
PH UR: 6 — SIGNIFICANT CHANGE UP (ref 5–8)
PHOSPHATE SERPL-MCNC: 3.7 MG/DL — SIGNIFICANT CHANGE UP (ref 2.1–4.9)
PLATELET # BLD AUTO: 327 K/UL — SIGNIFICANT CHANGE UP (ref 130–400)
POTASSIUM SERPL-MCNC: 4.5 MMOL/L — SIGNIFICANT CHANGE UP (ref 3.5–5)
POTASSIUM SERPL-SCNC: 4.5 MMOL/L — SIGNIFICANT CHANGE UP (ref 3.5–5)
PROT UR-MCNC: ABNORMAL
RBC # BLD: 3.78 M/UL — LOW (ref 4.2–5.4)
RBC # FLD: 14.2 % — SIGNIFICANT CHANGE UP (ref 11.5–14.5)
RBC CASTS # UR COMP ASSIST: 55 /HPF — HIGH (ref 0–4)
SODIUM SERPL-SCNC: 136 MMOL/L — SIGNIFICANT CHANGE UP (ref 135–146)
SP GR SPEC: 1.03 — HIGH (ref 1.01–1.02)
UROBILINOGEN FLD QL: ABNORMAL
WBC # BLD: 12.96 K/UL — HIGH (ref 4.8–10.8)
WBC # FLD AUTO: 12.96 K/UL — HIGH (ref 4.8–10.8)
WBC UR QL: 53 /HPF — HIGH (ref 0–5)

## 2019-09-09 PROCEDURE — 99223 1ST HOSP IP/OBS HIGH 75: CPT | Mod: AI

## 2019-09-09 RX ORDER — AMLODIPINE BESYLATE 2.5 MG/1
2.5 TABLET ORAL ONCE
Refills: 0 | Status: COMPLETED | OUTPATIENT
Start: 2019-09-09 | End: 2019-09-09

## 2019-09-09 RX ORDER — PANTOPRAZOLE SODIUM 20 MG/1
40 TABLET, DELAYED RELEASE ORAL
Refills: 0 | Status: DISCONTINUED | OUTPATIENT
Start: 2019-09-09 | End: 2019-09-11

## 2019-09-09 RX ADMIN — CEFTRIAXONE 100 MILLIGRAM(S): 500 INJECTION, POWDER, FOR SOLUTION INTRAMUSCULAR; INTRAVENOUS at 11:06

## 2019-09-09 RX ADMIN — AMLODIPINE BESYLATE 2.5 MILLIGRAM(S): 2.5 TABLET ORAL at 11:01

## 2019-09-09 RX ADMIN — Medication 81 MILLIGRAM(S): at 11:02

## 2019-09-09 RX ADMIN — APIXABAN 2.5 MILLIGRAM(S): 2.5 TABLET, FILM COATED ORAL at 17:09

## 2019-09-09 RX ADMIN — LOSARTAN POTASSIUM 100 MILLIGRAM(S): 100 TABLET, FILM COATED ORAL at 06:39

## 2019-09-09 RX ADMIN — AZITHROMYCIN 500 MILLIGRAM(S): 500 TABLET, FILM COATED ORAL at 11:02

## 2019-09-09 RX ADMIN — APIXABAN 2.5 MILLIGRAM(S): 2.5 TABLET, FILM COATED ORAL at 06:39

## 2019-09-09 NOTE — CONSULT NOTE ADULT - SUBJECTIVE AND OBJECTIVE BOX
Patient is a 89y old  Female who presents with a chief complaint of sob, cough, difficulty ambulating (09 Sep 2019 10:55)    HPI:  89 y/o female HTN, CVA in 2001 with residual right sided weakness, DLD, DVT s/p hip surgery, multiple mechanical falls in the past.  Patient is East [place]orese speaking and daughter is available for questioning.  As per daughter the mother had coughing and lethargy with sob and inabiltiy to ambulate for about one week.  As per the daughter the patient cough was productive of yellowish green sputum, associated with fevers and chills.  The daughter also explains that the mother received only tylenol for the symptoms and was given the medication for a temp of 104 recorded at home.  The inabilty ambulating has been an issue in the past requiring snf admission however this recent issue as per the daughter is likely due to her weakness from the symptoms described.  No complaints of chest pain, no abd pain, no headache, no nausea, no vomiting, no dysuria, no increase in frequency. No sick contacts, or recent travel noted. (08 Sep 2019 12:54)      PAST MEDICAL & SURGICAL HISTORY:  CVA (cerebral vascular accident)  DVT (deep venous thrombosis)  Hyperlipemia  No significant past surgical history      Hospital Course:  Cough and SOB secondary to Community acquired pneumonia   - CXR shows right upper lobe opacity  - wbc of 208[address] on admission. now down to 12.96  - Ceftriaxone 1g Q24 in ER Azithromycin 500 Q24    # Altered mental status  - patient confused. according to patient's daughter this is usual for her mother to be confused while she is in the hospital or in a new place. will monitor    # HTN, elevated  - losartan 100mg daily  - patient hypertensive overnight. was given hydralazine 10 mg IV push  - will start on amlodipine 2.5 mg    # ho of DVT s/p hip surgery  - on Eliquis 2.5 bid  - will continue    # Difficulty ambulating for one week, uses a walker at home, with residual deficits from prior cva  - pt/rehab    TODAY'S SUBJECTIVE & REVIEW OF SYMPTOMS:     Constitutional Weakness   Cardio WNL   Resp WNL   GI WNL  Heme WNL  Endo WNL  Skin WNL  MSK WNL  Neuro WNL  Cognitive WNL  Psych WNL      MEDICATIONS  (STANDING):  apixaban 2.5 milliGRAM(s) Oral two times a day  aspirin enteric coated 81 milliGRAM(s) Oral daily  azithromycin   Tablet 500 milliGRAM(s) Oral daily  cefTRIAXone   IVPB 1000 milliGRAM(s) IV Intermittent every 24 hours  chlorhexidine 4% Liquid 1 Application(s) Topical <User Schedule>  losartan 100 milliGRAM(s) Oral daily  pantoprazole    Tablet 40 milliGRAM(s) Oral before breakfast    MEDICATIONS  (PRN):  acetaminophen   Tablet .. 650 milliGRAM(s) Oral every 6 hours PRN Temp greater or equal to 38C (100.4F)  lidocaine   Patch 1 Patch Transdermal daily PRN pain  metoclopramide 10 milliGRAM(s) Oral daily PRN nausea and vomiting      FAMILY HISTORY:      Allergies    No Known Allergies    Intolerances        SOCIAL HISTORY:    [    ] Etoh  [    ] Smoking  [    ] Substance abuse     Home Environment:  [    ] Home Alone  [  x  ] Lives with Family  [ x   ] Home Health Aid12 X7    Dwelling:  [    ] Apartment  [    ] Private House  [    ] Adult Home  [    ] Skilled Nursing Facility      [    ] Short Term  [    ] Long Term  [    ] Stairs                           [    ] Elevator     FUNCTIONAL STATUS PTA: (Check all that apply)  Ambulation: [     ]Independent    [ x   ] Dependent     [    ] Non-Ambulatory  Assistive Device: [    ] SA Cane  [    ]  Q Cane  [ x   ] Walker  [    ]  Wheelchair  ADL : [    ] Independent  [  x  ]  Dependent       Vital Signs Last 24 Hrs  T(C): 36.4 (09 Sep 2019 13:10), Max: 37 (08 Sep 2019 16:40)  T(F): 97.6 (09 Sep 2019 13:10), Max: 98.6 (08 Sep 2019 16:40)  HR: 83 (09 Sep 2019 13:25) (70 - 88)  BP: 159/77 (09 Sep 2019 13:25) (125/69 - 215/101)  BP(mean): --  RR: 19 (09 Sep 2019 13:10) (17 - 19)  SpO2: 96% (09 Sep 2019 08:10) (96% - 98%)      PHYSICAL EXAM: Alert & follows commands  GENERAL: NAD, well-groomed, well-developed  HEAD:  Atraumatic, Normocephalic  EYES: EOMI, PERRLA, conjunctiva and sclera clear  NECK: Supple  CHEST/LUNG: Crackles R base  HEART: Regular rate and rhythm  ABDOMEN: Soft, Nontender, Nondistended; Bowel sounds present  EXTREMITIES:  no calf tenderness,no edema BLES    NERVOUS SYSTEM:  Cranial Nerves 2-12 intact [   x ] Abnormal  [    ]  ROM: WFL all extremities [  x  ]  Abnormal [     ]  Motor Strength: WFL all extremities  [  x  ]  Abnormal [    ]  Sensation: intact to light touch [x    ] Abnormal [    ]      FUNCTIONAL STATUS:  Bed Mobility: [   ]  Independent [    ]  Supervision [  x  ]  Needs Assistance [  ]  N/A  Transfers: [    ]  Independent [    ]  Supervision [   x ]  Needs Assistance [    ]  N/A    Ambulation:  [    ]  Independent [    ]  Supervision [  x  ]  Needs Assistance [    ]  N/A   ADL:  [    ]   Independent [  x  ] Requires Assistance [    ] N/A       LABS:                        11.3   12.96 )-----------( 327      ( 09 Sep 2019 07:57 )             34.0     09-09    136  |  99  |  24<H>  ----------------------------<  109<H>  4.5   |  25  |  0.9    Ca    9.0      09 Sep 2019 07:57  Phos  3.7     09-09  Mg     2.3     09-09    TPro  6.4  /  Alb  3.2<L>  /  TBili  0.8  /  DBili  x   /  AST  30  /  ALT  16  /  AlkPhos  122<H>  09-08          RADIOLOGY & ADDITIONAL STUDIES:

## 2019-09-09 NOTE — CONSULT NOTE ADULT - ASSESSMENT
IMPRESSION: Rehab of Debilitation pneumonia hx CVA    PRECAUTIONS: [x    ] Cardiac  [   x ] Respiratory  [    ] Seizures [    ] Contact Isolation  [    ] Droplet Isolation  [    ] Other    Weight Bearing Status:     RECOMMENDATION:    Out of Bed to Chair     DVT/Decubiti Prophylaxis    REHAB PLAN:     [  x   ] Bedside P/T 3-5 times a week   [     ] Bedside O/T  2-3 times a week   [     ] No Rehab Therapy Indicated   [     ]  Speech Therapy   Conditioning/ROM                                 ADL  Bed Mobility                                            Conditioning/ROM  Transfers                                                  Bed Mobility  Sitting /Standing Balance                      Transfers                                        Gait Training                                            Sitting/Standing Balance  Stair Training [   ]Applicable                 Home equipment Eval                                                                     Splinting  [   ] Only      GOALS:   ADL   [   x ]   Independent         Transfers  [ x   ] Independent            Ambulation  [   x  ] Independent     [   x  ] With device                            [    ]  CG                                               [    ]  CG                                                    [     ] CG                            [    ] Min A                                          [    ] Min A                                                [     ] Min  A          DISCHARGE PLAN:   [     ]  Good candidate for Intensive Rehabilitation/Hospital based                                             Will tolerate 3hrs Intensive Rehab Daily                                       [   x   ]  Short Term Rehab in Skilled Nursing Facility                               VS                                      [   x   ]  Home with Outpatient or VN services HAS 12 X7 HHA                                         [      ]  Possible Candidate for Intensive Hospital based Rehab

## 2019-09-09 NOTE — PROGRESS NOTE ADULT - ASSESSMENT
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 now down to 12.96  - Ceftriaxone 1g Q24 in ER Azithromycin 500 Q24    # Altered mental status  - patient confused. will contact daughter to ask patients baseline     # HTN, elevated  - losartan 100mg daily  - patient hypertensive overnight. was given hydralazine 10 mg IV push  - will start on amlodipine 2.5 mg    # ho of DVT s/p hip surgery  - on Eliquis 2.5 bid  - will continue    # Difficulty ambulating for one week, uses a walker at home, with residual deficits from prior cva  - pt/rehab    Activity: as tolerated  diet: dash  dvt ppx: not indicated as patient on Eliquis   GI prophylaxis: pantoprazole   full code  dispo: from home, lives with daughter has been to snf in past. 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 now down to 12.96  - Ceftriaxone 1g Q24 in ER Azithromycin 500 Q24    # Altered mental status  - patient confused. according to patient's daughter this is usual for her mother to be confused while she is in the hospital or in a new place. will monitor    # HTN, elevated  - losartan 100mg daily  - patient hypertensive overnight. was given hydralazine 10 mg IV push  - will start on amlodipine 2.5 mg    # ho of DVT s/p hip surgery  - on Eliquis 2.5 bid  - will continue    # Difficulty ambulating for one week, uses a walker at home, with residual deficits from prior cva  - pt/rehab    Activity: as tolerated  diet: dash  dvt ppx: not indicated as patient on Eliquis   GI prophylaxis: pantoprazole   full code  dispo: daughter wants mother to go to [place]

## 2019-09-10 LAB
ANION GAP SERPL CALC-SCNC: 12 MMOL/L — SIGNIFICANT CHANGE UP (ref 7–14)
BASOPHILS # BLD AUTO: 0.04 K/UL — SIGNIFICANT CHANGE UP (ref 0–0.2)
BASOPHILS NFR BLD AUTO: 0.4 % — SIGNIFICANT CHANGE UP (ref 0–1)
BUN SERPL-MCNC: 24 MG/DL — HIGH (ref 10–20)
CALCIUM SERPL-MCNC: 8.9 MG/DL — SIGNIFICANT CHANGE UP (ref 8.5–10.1)
CHLORIDE SERPL-SCNC: 100 MMOL/L — SIGNIFICANT CHANGE UP (ref 98–110)
CO2 SERPL-SCNC: 27 MMOL/L — SIGNIFICANT CHANGE UP (ref 17–32)
CREAT SERPL-MCNC: 1.1 MG/DL — SIGNIFICANT CHANGE UP (ref 0.7–1.5)
CULTURE RESULTS: SIGNIFICANT CHANGE UP
EOSINOPHIL # BLD AUTO: 0.09 K/UL — SIGNIFICANT CHANGE UP (ref 0–0.7)
EOSINOPHIL NFR BLD AUTO: 0.9 % — SIGNIFICANT CHANGE UP (ref 0–8)
GLUCOSE SERPL-MCNC: 109 MG/DL — HIGH (ref 70–99)
HCT VFR BLD CALC: 34.5 % — LOW (ref 37–47)
HGB BLD-MCNC: 11.2 G/DL — LOW (ref 12–16)
IMM GRANULOCYTES NFR BLD AUTO: 1.4 % — HIGH (ref 0.1–0.3)
LYMPHOCYTES # BLD AUTO: 2.34 K/UL — SIGNIFICANT CHANGE UP (ref 1.2–3.4)
LYMPHOCYTES # BLD AUTO: 23.8 % — SIGNIFICANT CHANGE UP (ref 20.5–51.1)
MAGNESIUM SERPL-MCNC: 2.3 MG/DL — SIGNIFICANT CHANGE UP (ref 1.8–2.4)
MCHC RBC-ENTMCNC: 29.6 PG — SIGNIFICANT CHANGE UP (ref 27–31)
MCHC RBC-ENTMCNC: 32.5 G/DL — SIGNIFICANT CHANGE UP (ref 32–37)
MCV RBC AUTO: 91.3 FL — SIGNIFICANT CHANGE UP (ref 81–99)
MONOCYTES # BLD AUTO: 1.27 K/UL — HIGH (ref 0.1–0.6)
MONOCYTES NFR BLD AUTO: 12.9 % — HIGH (ref 1.7–9.3)
NEUTROPHILS # BLD AUTO: 5.94 K/UL — SIGNIFICANT CHANGE UP (ref 1.4–6.5)
NEUTROPHILS NFR BLD AUTO: 60.6 % — SIGNIFICANT CHANGE UP (ref 42.2–75.2)
NRBC # BLD: 0 /100 WBCS — SIGNIFICANT CHANGE UP (ref 0–0)
PHOSPHATE SERPL-MCNC: 3.6 MG/DL — SIGNIFICANT CHANGE UP (ref 2.1–4.9)
PLATELET # BLD AUTO: 367 K/UL — SIGNIFICANT CHANGE UP (ref 130–400)
POTASSIUM SERPL-MCNC: 4.7 MMOL/L — SIGNIFICANT CHANGE UP (ref 3.5–5)
POTASSIUM SERPL-SCNC: 4.7 MMOL/L — SIGNIFICANT CHANGE UP (ref 3.5–5)
RBC # BLD: 3.78 M/UL — LOW (ref 4.2–5.4)
RBC # FLD: 14.2 % — SIGNIFICANT CHANGE UP (ref 11.5–14.5)
SODIUM SERPL-SCNC: 139 MMOL/L — SIGNIFICANT CHANGE UP (ref 135–146)
SPECIMEN SOURCE: SIGNIFICANT CHANGE UP
WBC # BLD: 9.82 K/UL — SIGNIFICANT CHANGE UP (ref 4.8–10.8)
WBC # FLD AUTO: 9.82 K/UL — SIGNIFICANT CHANGE UP (ref 4.8–10.8)

## 2019-09-10 PROCEDURE — 99232 SBSQ HOSP IP/OBS MODERATE 35: CPT

## 2019-09-10 RX ORDER — AMLODIPINE BESYLATE 2.5 MG/1
2.5 TABLET ORAL DAILY
Refills: 0 | Status: DISCONTINUED | OUTPATIENT
Start: 2019-09-10 | End: 2019-09-11

## 2019-09-10 RX ADMIN — Medication 100 MILLIGRAM(S): at 11:31

## 2019-09-10 RX ADMIN — PANTOPRAZOLE SODIUM 40 MILLIGRAM(S): 20 TABLET, DELAYED RELEASE ORAL at 06:32

## 2019-09-10 RX ADMIN — APIXABAN 2.5 MILLIGRAM(S): 2.5 TABLET, FILM COATED ORAL at 05:39

## 2019-09-10 RX ADMIN — LOSARTAN POTASSIUM 100 MILLIGRAM(S): 100 TABLET, FILM COATED ORAL at 05:39

## 2019-09-10 RX ADMIN — APIXABAN 2.5 MILLIGRAM(S): 2.5 TABLET, FILM COATED ORAL at 17:14

## 2019-09-10 RX ADMIN — CHLORHEXIDINE GLUCONATE 1 APPLICATION(S): 213 SOLUTION TOPICAL at 05:37

## 2019-09-10 RX ADMIN — AZITHROMYCIN 500 MILLIGRAM(S): 500 TABLET, FILM COATED ORAL at 11:23

## 2019-09-10 RX ADMIN — AMLODIPINE BESYLATE 2.5 MILLIGRAM(S): 2.5 TABLET ORAL at 09:16

## 2019-09-10 RX ADMIN — CEFTRIAXONE 100 MILLIGRAM(S): 500 INJECTION, POWDER, FOR SOLUTION INTRAMUSCULAR; INTRAVENOUS at 11:26

## 2019-09-10 NOTE — PROGRESS NOTE ADULT - ASSESSMENT
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      # Community acquired pneumonia; no sepsis on admission  cxr shows faint right upper lobe haziness c/w early infiltrate, minor  c/w rocephin/azithro - will change to oral soon  not on O2    # HTN, better  losartan 100mg daily  c/w norvasc 2.5mg po q24     # hx of DVT s/p hip surgery  on elliquis 2.5 bid, will continue    # hx CVA  since on a/c, d/c asa    # Difficulty ambulating for one week, uses a walker at home, with residual deficits from prior cva - pt/rehab ordered  dtr prefers pt go to SNF for STR    # baseline hx of dementia, unknown type - no behavioral issues  observe  not on meds    # Activity: as tolerated w/ asst    # dvt ppx: eliquis    # gi ppx: PPI po    # full code - will d/w dtr in person    dispo: Tx PNA; rehab eval for STR at SNF; limit labs; anticipate d/c jose to SNF

## 2019-09-10 NOTE — PROGRESS NOTE ADULT - ASSESSMENT
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  - UA positive  - Patient getting ceftriaxone for pneumonia  - will follow urine culture    # Altered mental status  - patient less confused than yesterday. will monitor    # HTN  - losartan 100mg daily  - amlodipine 2.5 mg  - patient still hypertensive overnight. will monitor    # ho of DVT s/p hip surgery  - on Eliquis 2.5 bid  - will continue    # Difficulty ambulating for one week, uses a walker at home, with residual deficits from prior cva  - pt/rehab    Activity: as tolerated  diet: dash  dvt ppx: not indicated as patient on Eliquis   GI prophylaxis: pantoprazole   full code  dispo: daughter wants mother to go to [place]

## 2019-09-10 NOTE — PHYSICAL THERAPY INITIAL EVALUATION ADULT - LEVEL OF INDEPENDENCE: GAIT, REHAB EVAL
contact guard/pt ambulated 30ft RW constant close CG. pt with a slow gait pattern, unsure of her own balance at times.

## 2019-09-10 NOTE — PHYSICAL THERAPY INITIAL EVALUATION ADULT - PLANNED THERAPY INTERVENTIONS, PT EVAL
gait training/bed mobility training/strengthening/balance training/neuromuscular re-education/postural re-education/transfer training

## 2019-09-10 NOTE — PHYSICAL THERAPY INITIAL EVALUATION ADULT - PERTINENT HX OF CURRENT PROBLEM, REHAB EVAL
Patient is a 89y old  Female who presents with a chief complaint of sob, cough, difficulty ambulating

## 2019-09-11 ENCOUNTER — TRANSCRIPTION ENCOUNTER (OUTPATIENT)
Age: 84
End: 2019-09-11

## 2019-09-11 VITALS
SYSTOLIC BLOOD PRESSURE: 138 MMHG | DIASTOLIC BLOOD PRESSURE: 65 MMHG | HEART RATE: 79 BPM | RESPIRATION RATE: 18 BRPM | TEMPERATURE: 98 F

## 2019-09-11 LAB
ANION GAP SERPL CALC-SCNC: 10 MMOL/L — SIGNIFICANT CHANGE UP (ref 7–14)
BUN SERPL-MCNC: 24 MG/DL — HIGH (ref 10–20)
CALCIUM SERPL-MCNC: 8.7 MG/DL — SIGNIFICANT CHANGE UP (ref 8.5–10.1)
CHLORIDE SERPL-SCNC: 102 MMOL/L — SIGNIFICANT CHANGE UP (ref 98–110)
CO2 SERPL-SCNC: 26 MMOL/L — SIGNIFICANT CHANGE UP (ref 17–32)
CREAT SERPL-MCNC: 1 MG/DL — SIGNIFICANT CHANGE UP (ref 0.7–1.5)
GLUCOSE SERPL-MCNC: 102 MG/DL — HIGH (ref 70–99)
HCT VFR BLD CALC: 33.4 % — LOW (ref 37–47)
HGB BLD-MCNC: 10.9 G/DL — LOW (ref 12–16)
MAGNESIUM SERPL-MCNC: 2.2 MG/DL — SIGNIFICANT CHANGE UP (ref 1.8–2.4)
MCHC RBC-ENTMCNC: 29.9 PG — SIGNIFICANT CHANGE UP (ref 27–31)
MCHC RBC-ENTMCNC: 32.6 G/DL — SIGNIFICANT CHANGE UP (ref 32–37)
MCV RBC AUTO: 91.8 FL — SIGNIFICANT CHANGE UP (ref 81–99)
NRBC # BLD: 0 /100 WBCS — SIGNIFICANT CHANGE UP (ref 0–0)
PHOSPHATE SERPL-MCNC: 3.7 MG/DL — SIGNIFICANT CHANGE UP (ref 2.1–4.9)
PLATELET # BLD AUTO: 394 K/UL — SIGNIFICANT CHANGE UP (ref 130–400)
POTASSIUM SERPL-MCNC: 4.8 MMOL/L — SIGNIFICANT CHANGE UP (ref 3.5–5)
POTASSIUM SERPL-SCNC: 4.8 MMOL/L — SIGNIFICANT CHANGE UP (ref 3.5–5)
RBC # BLD: 3.64 M/UL — LOW (ref 4.2–5.4)
RBC # FLD: 14.2 % — SIGNIFICANT CHANGE UP (ref 11.5–14.5)
SODIUM SERPL-SCNC: 138 MMOL/L — SIGNIFICANT CHANGE UP (ref 135–146)
WBC # BLD: 10.72 K/UL — SIGNIFICANT CHANGE UP (ref 4.8–10.8)
WBC # FLD AUTO: 10.72 K/UL — SIGNIFICANT CHANGE UP (ref 4.8–10.8)

## 2019-09-11 PROCEDURE — 99239 HOSP IP/OBS DSCHRG MGMT >30: CPT

## 2019-09-11 RX ORDER — CEFPODOXIME PROXETIL 100 MG
200 TABLET ORAL
Qty: 8 | Refills: 0
Start: 2019-09-11 | End: 2019-09-14

## 2019-09-11 RX ORDER — AMLODIPINE BESYLATE 2.5 MG/1
1 TABLET ORAL
Qty: 15 | Refills: 0
Start: 2019-09-11 | End: 2019-09-25

## 2019-09-11 RX ORDER — AZITHROMYCIN 500 MG/1
250 TABLET, FILM COATED ORAL
Qty: 4 | Refills: 0
Start: 2019-09-11 | End: 2019-09-14

## 2019-09-11 RX ADMIN — CEFTRIAXONE 100 MILLIGRAM(S): 500 INJECTION, POWDER, FOR SOLUTION INTRAMUSCULAR; INTRAVENOUS at 12:26

## 2019-09-11 RX ADMIN — AMLODIPINE BESYLATE 2.5 MILLIGRAM(S): 2.5 TABLET ORAL at 05:54

## 2019-09-11 RX ADMIN — APIXABAN 2.5 MILLIGRAM(S): 2.5 TABLET, FILM COATED ORAL at 05:54

## 2019-09-11 RX ADMIN — CHLORHEXIDINE GLUCONATE 1 APPLICATION(S): 213 SOLUTION TOPICAL at 05:53

## 2019-09-11 RX ADMIN — AZITHROMYCIN 500 MILLIGRAM(S): 500 TABLET, FILM COATED ORAL at 12:26

## 2019-09-11 RX ADMIN — PANTOPRAZOLE SODIUM 40 MILLIGRAM(S): 20 TABLET, DELAYED RELEASE ORAL at 06:08

## 2019-09-11 RX ADMIN — LOSARTAN POTASSIUM 100 MILLIGRAM(S): 100 TABLET, FILM COATED ORAL at 05:54

## 2019-09-11 NOTE — DISCHARGE NOTE PROVIDER - CARE PROVIDER_API CALL
Eloisa Hooks (DO)  Family Medicine  [address] [place] [id number]  Phone: [phone]  Fax: [phone]  Follow Up Time: 1-3 days

## 2019-09-11 NOTE — PROGRESS NOTE ADULT - ASSESSMENT
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      # Community acquired pneumonia; no sepsis on admission  cxr shows faint right upper lobe haziness c/w early infiltrate, minor  change rocephin/azithro - to vantin 200mg po q12 and azithro 250mg po q24 for total abx of 7 days and stop.  not on O2    # HTN, better  losartan 100mg daily  c/w norvasc 2.5mg po qHS    # hx of DVT s/p hip surgery  on elliquis 2.5 bid, will continue    # hx CVA  since on a/c, d/c asa    # Difficulty ambulating for one week, uses a walker at home, with residual deficits from prior cva - pt/rehab ordered  dtr prefers pt go to SNF for STR    # baseline hx of dementia, unknown type - no behavioral issues  observe  not on meds    # Activity: as tolerated w/ asst    # dvt ppx: eliquis    # gi ppx: PPI po    # full code - per family, can readdress at another time    dispo: Tx PNA; rehab eval for STR at SNF; stable for d/c to SNF

## 2019-09-11 NOTE — DISCHARGE NOTE PROVIDER - NSDCFUADDINST_GEN_ALL_CORE_FT
If you feel worsening of symptoms or experience any new symptoms please call 911 or go to the nearest emergency department

## 2019-09-11 NOTE — PROGRESS NOTE ADULT - SUBJECTIVE AND OBJECTIVE BOX
LYLY ALEXANDER  89y  Female  ***My note supersedes ALL resident notes that I sign.  My corrections for their notes are in my note.***    I can be reached directly on [employer] [phone]. My office number is [phone]. My personal cell number is [phone].    Argent[place] Trans: Lisbet, CAROLINE    INTERVAL EVENTS: Here for f/u of PNA. Pt just c/o minor cough. Otherwise, fine. Looks good for 89 yo.    T(F): 98.2 (19 @ 13:31), Max: 98.2 (19 @ 13:31)  HR: 79 (19 @ 13:31) (70 - 79)  BP: 138/65 (19 @ 13:31) (133/63 - 188/85)  RR: 18 (19 @ 13:31) (18 - 18)  SpO2: --    Gen: NAD; baseline confusion - but speaks fairly well  neck: no nodes  lungs: clr, no crackle or wheeze  hrt s1 s2 rrr  abd soft Nt/Nd no masses  ext no edema, no c/c    LABS:                        10.9    (    91.8   10.72 )-----------( ---------      394      ( 11 Sep 2019 07:59 )             33.4    (    14.2     138   (   102   (   102      19 @ 07:59  ----------------------               4.8   (   26   (   24                             -----                        1.0  Ca  8.7   Mg  2.2    P   3.7     Urinalysis Basic - ( 09 Sep 2019 22:40 )    Color: Yellow / Appearance: Slightly Turbid / S.031 / pH: x  Gluc: x / Ketone: Negative  / Bili: Negative / Urobili: 6 mg/dL   Blood: x / Protein: 30 mg/dL / Nitrite: Negative   Leuk Esterase: Large / RBC: 55 /HPF / WBC 53 /HPF   Sq Epi: x / Non Sq Epi: 4 /HPF / Bacteria: Negative    Culture - Urine (collected 19 @ 22:40)  Source: .Urine Catheterized  Final Report (09-10-19 @ 22:36):    <10,000 CFU/mL Normal Urogenital Nicky    Culture - Blood (collected 19 @ 10:58)  Source: .Blood Blood  Preliminary Report (19 @ 18:01):    No growth to date.    Culture - Blood (collected 19 @ 10:58)  Source: .Blood Blood  Preliminary Report (19 @ 18:01):    No growth to date.    RADIOLOGY & ADDITIONAL TESTS:      MEDICATIONS:  azithromycin   Tablet 500 milliGRAM(s) Oral daily  cefTRIAXone   IVPB 1000 milliGRAM(s) IV Intermittent every 24 hours    acetaminophen   Tablet .. 650 milliGRAM(s) Oral every 6 hours PRN  amLODIPine   Tablet 2.5 milliGRAM(s) Oral daily  apixaban 2.5 milliGRAM(s) Oral two times a day  chlorhexidine 4% Liquid 1 Application(s) Topical <User Schedule>  guaiFENesin    Syrup 100 milliGRAM(s) Oral every 6 hours PRN  lidocaine   Patch 1 Patch Transdermal daily PRN  losartan 100 milliGRAM(s) Oral daily  metoclopramide 10 milliGRAM(s) Oral daily PRN  pantoprazole    Tablet 40 milliGRAM(s) Oral before breakfast
LYLY ALEXANDER  89y  Female  ***My note supersedes ALL resident notes that I sign.  My corrections for their notes are in my note.***    I can be reached directly on [employer] [phone]. My office number is [phone]. My personal cell number is [phone].    Samoan Trans: RAKESH Lopez    INTERVAL EVENTS: Here for f/u of PNA. Pt c/o occ cough. Pt says she is otherwise OK. No behavioral issues overnight. Pt eating OK.    T(F): 99.1 (09-10-19 @ 13:19), Max: 99.1 (09-10-19 @ 13:19)  HR: 95 (09-10-19 @ 13:19) (67 - 95)  BP: 120/63 (09-10-19 @ 13:19) (120/63 - 177/79)  RR: 18 (09-10-19 @ 13:19) (18 - 18)  SpO2: 94% (09-10-19 @ 08:04) (94% - 95%)    Gen: NAD; baseline confusion - but speaks fairly well  neck: no nodes  lungs: clr, no crackle or wheeze  hrt s1 s2 rrr  abd soft Nt/Nd no masses  ext no edema, no c/c    LABS:                        11.2    (    91.3   9.82  )-----------( ---------      367      ( 10 Sep 2019 07:08 )             34.5    (    14.2     139   (   100   (   109      09-10-19 @ 07:08  ----------------------               4.7   (   27   (   24                             -----                        1.1  Ca  8.9   Mg  2.3    P   3.6     Urinalysis Basic - ( 09 Sep 2019 22:40 )    Color: Yellow / Appearance: Slightly Turbid / S.031 / pH: x  Gluc: x / Ketone: Negative  / Bili: Negative / Urobili: 6 mg/dL   Blood: x / Protein: 30 mg/dL / Nitrite: Negative   Leuk Esterase: Large / RBC: 55 /HPF / WBC 53 /HPF   Sq Epi: x / Non Sq Epi: 4 /HPF / Bacteria: Negative    Culture - Blood (collected 19 @ 10:58)  Source: .Blood Blood  Preliminary Report (19 @ 18:01):    No growth to date.    Culture - Blood (collected 19 @ 10:58)  Source: .Blood Blood  Preliminary Report (19 @ 18:01):    No growth to date.    RADIOLOGY & ADDITIONAL TESTS:      MEDICATIONS:  azithromycin   Tablet 500 milliGRAM(s) Oral daily  cefTRIAXone   IVPB 1000 milliGRAM(s) IV Intermittent every 24 hours    acetaminophen   Tablet .. 650 milliGRAM(s) Oral every 6 hours PRN  amLODIPine   Tablet 2.5 milliGRAM(s) Oral daily  apixaban 2.5 milliGRAM(s) Oral two times a day  chlorhexidine 4% Liquid 1 Application(s) Topical <User Schedule>  guaiFENesin    Syrup 100 milliGRAM(s) Oral every 6 hours PRN  lidocaine   Patch 1 Patch Transdermal daily PRN  losartan 100 milliGRAM(s) Oral daily  metoclopramide 10 milliGRAM(s) Oral daily PRN  pantoprazole    Tablet 40 milliGRAM(s) Oral before breakfast
SUBJECTIVE:    Patient is a 89y old Female who presents with a chief complaint of sob, cough, difficulty ambulating    Currently admitted to medicine with the primary diagnosis of Pneumonia     Today is hospital day 2d. This morning she is resting comfortably in bed and reports no new issues or overnight events. She says she is feeling better and looks much more aware and alert than yesterday    PAST MEDICAL & SURGICAL HISTORY  CVA (cerebral vascular accident)  DVT (deep venous thrombosis)  Hyperlipemia  No significant past surgical history      ALLERGIES:  No Known Allergies    MEDICATIONS:  STANDING MEDICATIONS  amLODIPine   Tablet 2.5 milliGRAM(s) Oral daily  apixaban 2.5 milliGRAM(s) Oral two times a day  azithromycin   Tablet 500 milliGRAM(s) Oral daily  cefTRIAXone   IVPB 1000 milliGRAM(s) IV Intermittent every 24 hours  chlorhexidine 4% Liquid 1 Application(s) Topical <User Schedule>  losartan 100 milliGRAM(s) Oral daily  pantoprazole    Tablet 40 milliGRAM(s) Oral before breakfast    PRN MEDICATIONS  acetaminophen   Tablet .. 650 milliGRAM(s) Oral every 6 hours PRN  guaiFENesin    Syrup 100 milliGRAM(s) Oral every 6 hours PRN  lidocaine   Patch 1 Patch Transdermal daily PRN  metoclopramide 10 milliGRAM(s) Oral daily PRN    VITALS:   T(F): 96.1  HR: 67  BP: 145/64  RR: 18  SpO2: 94%    LABS:                        11.2   9.82  )-----------( 367      ( 10 Sep 2019 07:08 )             34.5     09-10    139  |  100  |  24<H>  ----------------------------<  109<H>  4.7   |  27  |  1.1    Ca    8.9      10 Sep 2019 07:08  Phos  3.6     09-10  Mg     2.3     09-10        Urinalysis Basic - ( 09 Sep 2019 22:40 )    Color: Yellow / Appearance: Slightly Turbid / S.031 / pH: x  Gluc: x / Ketone: Negative  / Bili: Negative / Urobili: 6 mg/dL   Blood: x / Protein: 30 mg/dL / Nitrite: Negative   Leuk Esterase: Large / RBC: 55 /HPF / WBC 53 /HPF   Sq Epi: x / Non Sq Epi: 4 /HPF / Bacteria: Negative            Culture - Blood (collected 08 Sep 2019 10:58)  Source: .Blood Blood  Preliminary Report (09 Sep 2019 18:01):    No growth to date.    Culture - Blood (collected 08 Sep 2019 10:58)  Source: .Blood Blood  Preliminary Report (09 Sep 2019 18:01):    No growth to date.      PHYSICAL EXAM:  GEN: No acute distress  LUNGS: Dec entry with some crackles on the right side  HEART: S1/S2 present. RRR.   ABD: Soft  EXT: Non focal  NEURO: AAOX3
SUBJECTIVE:    Patient is a 89y old Female who presents with a chief complaint of sob, cough, difficulty ambulating    Currently admitted to medicine with the primary diagnosis of Pneumonia       PAST MEDICAL & SURGICAL HISTORY  CVA (cerebral vascular accident)  DVT (deep venous thrombosis)  Hyperlipemia  No significant past surgical history      ALLERGIES:  No Known Allergies    MEDICATIONS:  STANDING MEDICATIONS  amLODIPine   Tablet 2.5 milliGRAM(s) Oral once  apixaban 2.5 milliGRAM(s) Oral two times a day  aspirin enteric coated 81 milliGRAM(s) Oral daily  azithromycin   Tablet 500 milliGRAM(s) Oral daily  cefTRIAXone   IVPB 1000 milliGRAM(s) IV Intermittent every 24 hours  chlorhexidine 4% Liquid 1 Application(s) Topical <User Schedule>  losartan 100 milliGRAM(s) Oral daily    PRN MEDICATIONS  acetaminophen   Tablet .. 650 milliGRAM(s) Oral every 6 hours PRN  lidocaine   Patch 1 Patch Transdermal daily PRN  metoclopramide 10 milliGRAM(s) Oral daily PRN    VITALS:   T(F): 97.3  HR: 75  BP: 125/69  RR: 18  SpO2: 96%    LABS:                        11.3   12.96 )-----------( 327      ( 09 Sep 2019 07:57 )             34.0     09-09    136  |  99  |  24<H>  ----------------------------<  109<H>  4.5   |  25  |  0.9    Ca    9.0      09 Sep 2019 07:57  Phos  3.7     09-09  Mg     2.3     09-09    TPro  6.4  /  Alb  3.2<L>  /  TBili  0.8  /  DBili  x   /  AST  30  /  ALT  16  /  AlkPhos  122<H>  09-08          Lactate, Blood: 1.5 mmol/L (09-08-19 @ 10:58)          RADIOLOGY:  < from: Xray Chest 1 View AP/PA (09.08.19 @ 12:08) >  Impression:    Right upper lobe opacities may reflect pneumonia in the appropriate   clinical setting.  < end of copied text >      PHYSICAL EXAM:  GEN: No acute distress  LUNGS: right sided crackles  HEART: S1/S2 present. RRR.   ABD: Soft  EXT: non focal  NEURO: AAOX1

## 2019-09-11 NOTE — DISCHARGE NOTE PROVIDER - HOSPITAL COURSE
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 As per daughter the mother had coughing and lethargy with SOB and inability to ambulate for about one week. As per the daughter the patient's cough was productive of yellowish green sputum, associated with fevers and chills. The daughter also explains that the mother received only tylenol for the symptoms and was given the medication for a temp of 104 recorded at home. The inability ambulating has been an issue in the past requiring snf admission however this recent issue as per the daughter is likely due to her weakness from the symptoms described.         Cough and SOB secondary to Community acquired pneumonia was suspected. CXR shows right upper lobe opacity. WBC were downtrending 03633 --> 12.96 --> 9.82 while her stay in the hospital. Patient is getting Ceftriaxone 1g Q24 in Azithromycin 500 Q24. She will need a total of 7 days of antibiotics. Since she is already done with 3 days just 4 more days of antibiotics. Azithromycin is being decreased to 250 Q24 on discharge.             # HTN    - patient was hypertensive during her hospital stay and was started on amlodipine 2.5 mg Q24. She is being discharged on same medication but she will be taking it at night and losartan in the morning.    - losartan 100mg daily    - amlodipine 2.5 mg        # ho of DVT s/p hip surgery    - on Eliquis 2.5 bid    - will continue        # Difficulty ambulating for one week, uses a walker at home, with residual deficits from prior cva    - pt/rehab        # Disposition: Patient going to snf

## 2019-09-11 NOTE — DISCHARGE NOTE NURSING/CASE MANAGEMENT/SOCIAL WORK - NSFLUVACAGEDISCH_IMM_ALL_CORE
· Med Name & Dose: Losartan-Hydrochlorothiazide 100-25 mg   · Last refill was 5/10/19 Number of Refills sent: 4  · Quantity of medication given 90 day supply  · Last visit for that condition 5/10/19  · Date of next appt: 11/8/2019  · Date of any Lab results pertaining to medication:     Okay to refill?               Adult

## 2019-09-11 NOTE — PROGRESS NOTE ADULT - REASON FOR ADMISSION
sob, cough, difficulty ambulating

## 2019-09-11 NOTE — DISCHARGE NOTE PROVIDER - NSDCCPCAREPLAN_GEN_ALL_CORE_FT
PRINCIPAL DISCHARGE DIAGNOSIS  Diagnosis: Pneumonia  Assessment and Plan of Treatment: You came to the hospital with cough along with sputum. You were found to have pneumonia and was treated with antibiotics. You still have to take antibiotics for 4 more days. Please take your medication regularly. If you feel worsening of symptoms please call 911 or go to the nearest emergency department.      SECONDARY DISCHARGE DIAGNOSES  Diagnosis: HTN (hypertension)  Assessment and Plan of Treatment: You blood pressure was high and you were started on a new medication during your hospital stay. Please keep taking you new medication regualrly,

## 2019-09-11 NOTE — CHART NOTE - NSCHARTNOTEFT_GEN_A_CORE
<<<RESIDENT DISCHARGE NOTE>>>     LYLY ALEXANDER  MRN-[id number]    VITAL SIGNS:  T(F): 98.2 (09-11-19 @ 13:31), Max: 98.2 (09-11-19 @ 13:31)  HR: 79 (09-11-19 @ 13:31)  BP: 138/65 (09-11-19 @ 13:31)  SpO2: --      PHYSICAL EXAMINATION:  GEN: No acute distress  LUNGS: Dec entry with some crackles on the right side  HEART: S1/S2 present. RRR.   ABD: Soft  EXT: Non focal  NEURO: AAOX3    TEST RESULTS:                        10.9   10.72 )-----------( 394      ( 11 Sep 2019 07:59 )             33.4       09-11    138  |  102  |  24<H>  ----------------------------<  102<H>  4.8   |  26  |  1.0    Ca    8.7      11 Sep 2019 07:59  Phos  3.7     09-11  Mg     2.2     09-11        FINAL DISCHARGE INTERVIEW:  Resident(s) Present: (Name: Virgie Thakur MD), RN Present: (Name: Carine)    DISCHARGE MEDICATION RECONCILIATION  reviewed with Attending (Name: Dr Johnston)    DISPOSITION:   [  ] Home,    [  ] Home with Visiting Nursing Services,   [  X  ]  SNF/ NH,    [   ] Acute Rehab (4A),   [   ] Other (Specify:_________)

## 2019-09-11 NOTE — DISCHARGE NOTE NURSING/CASE MANAGEMENT/SOCIAL WORK - PATIENT PORTAL LINK FT
You can access the FollowMyHealth Patient Portal offered by [facility] by registering at the following website: [email or web address]. By joining [facility]’s FollowMyHealth portal, you will also be able to view your health information using other applications (apps) compatible with our system.

## 2019-09-11 NOTE — DISCHARGE NOTE NURSING/CASE MANAGEMENT/SOCIAL WORK - NSDCPEPTSTRK_GEN_ALL_CORE
Stroke support groups for patients, families, and friends/Stroke warning signs and symptoms/Signs and symptoms of stroke/Call 911 for stroke/Prescribed medications/Stroke education booklet/Need for follow up after discharge/Risk factors for stroke

## 2019-09-12 RX ORDER — BENZONATATE 100 MG/1
100 CAPSULE ORAL EVERY 8 HOURS
Refills: 0 | Status: ACTIVE | COMMUNITY
Start: 2019-09-12

## 2019-09-12 RX ORDER — LOSARTAN POTASSIUM 100 MG/1
100 TABLET, FILM COATED ORAL DAILY
Refills: 0 | Status: ACTIVE | COMMUNITY
Start: 2019-09-12

## 2019-09-12 RX ORDER — AMLODIPINE BESYLATE 2.5 MG/1
2.5 TABLET ORAL DAILY
Refills: 0 | Status: ACTIVE | COMMUNITY
Start: 2019-09-12

## 2019-09-12 RX ORDER — APIXABAN 2.5 MG/1
2.5 TABLET, FILM COATED ORAL
Refills: 0 | Status: ACTIVE | COMMUNITY
Start: 2019-09-12

## 2019-09-12 RX ORDER — AZITHROMYCIN 250 MG/1
250 TABLET, FILM COATED ORAL
Refills: 0 | Status: ACTIVE | COMMUNITY
Start: 2019-09-12

## 2019-09-12 RX ORDER — CEFPODOXIME PROXETIL 200 MG/1
200 TABLET, FILM COATED ORAL
Refills: 0 | Status: ACTIVE | COMMUNITY
Start: 2019-09-12

## 2019-09-13 DIAGNOSIS — F03.90 UNSPECIFIED DEMENTIA WITHOUT BEHAVIORAL DISTURBANCE: ICD-10-CM

## 2019-09-13 DIAGNOSIS — J18.9 PNEUMONIA, UNSPECIFIED ORGANISM: ICD-10-CM

## 2019-09-13 DIAGNOSIS — E78.5 HYPERLIPIDEMIA, UNSPECIFIED: ICD-10-CM

## 2019-09-13 DIAGNOSIS — R82.71 BACTERIURIA: ICD-10-CM

## 2019-09-13 DIAGNOSIS — R53.81 OTHER MALAISE: ICD-10-CM

## 2019-09-13 DIAGNOSIS — Z86.718 PERSONAL HISTORY OF OTHER VENOUS THROMBOSIS AND EMBOLISM: ICD-10-CM

## 2019-09-13 DIAGNOSIS — Z79.01 LONG TERM (CURRENT) USE OF ANTICOAGULANTS: ICD-10-CM

## 2019-09-13 DIAGNOSIS — I10 ESSENTIAL (PRIMARY) HYPERTENSION: ICD-10-CM

## 2019-09-13 DIAGNOSIS — Z79.82 LONG TERM (CURRENT) USE OF ASPIRIN: ICD-10-CM

## 2019-09-13 DIAGNOSIS — I69.351 HEMIPLEGIA AND HEMIPARESIS FOLLOWING CEREBRAL INFARCTION AFFECTING RIGHT DOMINANT SIDE: ICD-10-CM

## 2019-09-13 DIAGNOSIS — R29.6 REPEATED FALLS: ICD-10-CM

## 2019-09-13 LAB
CULTURE RESULTS: SIGNIFICANT CHANGE UP
CULTURE RESULTS: SIGNIFICANT CHANGE UP
SPECIMEN SOURCE: SIGNIFICANT CHANGE UP
SPECIMEN SOURCE: SIGNIFICANT CHANGE UP

## 2019-11-18 NOTE — DISCHARGE NOTE PROVIDER - NSDCQMAMI_CARD_ALL_CORE
Benefits, risks, and possible complications of procedure explained to patient/caregiver who verbalized understanding and gave verbal consent. No

## 2022-04-04 NOTE — H&P ADULT - HISTORY OF PRESENT ILLNESS
89 year old F with PMH old stroke of unknown age (probably 1 years ago) with residual weakness and pain on Right side and on ASA, also diagnoses with DVT 10 days ago on Xarelto presented to ED s/p fall on Saturday, -HT, -LOC, +AC and +ASA. She fell on R side and striked her R cheek on floor. Since the fall, both pain and weakness has been increased and pt is unable to perform her activity of daily life as she was performing at baseline like using walker. She is complaining of pain all over body but more on R side. Pt does not remember well how she fell. Daughter reports confusion since fall. Pt is alert and oriented x 3 at this time.
Abdominal pain

## 2022-09-21 NOTE — PATIENT PROFILE ADULT - BRADEN ACTIVITY
(1) bedfast Ear Star Wedge Flap Text: The defect edges were debeveled with a #15 blade scalpel.  Given the location of the defect and the proximity to free margins (helical rim) an ear star wedge flap was deemed most appropriate.  Using a sterile surgical marker, the appropriate flap was drawn incorporating the defect and placing the expected incisions between the helical rim and antihelix where possible.  The area thus outlined was incised through and through with a #15 scalpel blade.

## 2022-12-27 NOTE — ED ADULT NURSE NOTE - ISOLATION TYPE:
Head , normocephalic , atraumatic , Face , Face within normal limits , Ears , External ears within normal limits , Nose/Nasopharynx , External nose  normal appearance , Mouth and Throat , Oral cavity appearance normal
None